# Patient Record
Sex: MALE | Race: BLACK OR AFRICAN AMERICAN | NOT HISPANIC OR LATINO | URBAN - METROPOLITAN AREA
[De-identification: names, ages, dates, MRNs, and addresses within clinical notes are randomized per-mention and may not be internally consistent; named-entity substitution may affect disease eponyms.]

---

## 2021-04-18 ENCOUNTER — EMERGENCY (EMERGENCY)
Facility: HOSPITAL | Age: 21
LOS: 1 days | Discharge: HOME | End: 2021-04-18
Admitting: EMERGENCY MEDICINE
Payer: COMMERCIAL

## 2021-04-18 VITALS
OXYGEN SATURATION: 98 % | HEART RATE: 83 BPM | SYSTOLIC BLOOD PRESSURE: 132 MMHG | RESPIRATION RATE: 16 BRPM | TEMPERATURE: 98 F | DIASTOLIC BLOOD PRESSURE: 74 MMHG

## 2021-04-18 PROCEDURE — 99283 EMERGENCY DEPT VISIT LOW MDM: CPT

## 2021-04-18 PROCEDURE — 73610 X-RAY EXAM OF ANKLE: CPT | Mod: 26,LT

## 2021-04-18 PROCEDURE — 73630 X-RAY EXAM OF FOOT: CPT | Mod: 26,LT

## 2021-04-18 RX ORDER — IBUPROFEN 200 MG
600 TABLET ORAL ONCE
Refills: 0 | Status: COMPLETED | OUTPATIENT
Start: 2021-04-18 | End: 2021-04-18

## 2021-04-18 RX ADMIN — Medication 600 MILLIGRAM(S): at 20:31

## 2021-04-18 NOTE — ED PROVIDER NOTE - PATIENT PORTAL LINK FT
You can access the FollowMyHealth Patient Portal offered by [facility] by registering at the following website: [email or web address]. By joining [employer]’s FollowMyHealth portal, you will also be able to view your health information using other applications (apps) compatible with our system.

## 2021-04-18 NOTE — ED PROVIDER NOTE - SKIN, MLM
From: Lesa Russo  To: RAKESH Sifuentes  Sent: 8/27/2019 4:44 PM EDT  Subject: Test Results Question    Was just wondering what my test results were for the emg?  ----- Message -----  From: Maci Diop  Sent: 6/10/2019 10:53 AM EDT  To: Lesa Russo  Subject: RE: Test Results Question  Hello    Your MRI cervical spine shows no recurrent or new stenosis. Recommendation is pain management. Please contact your insurance for a pain management provider. We will send referral and notes. ----- Message -----   From: Lesa Russo   Sent: 6/7/2019 2:43 PM EDT   To: RAKESH Sifuentes  Subject: Test Results Question    Did my MRI results come in yet? I haven't heard anything, sorry to be a pain, just wanted answers for issues. Thank you! Skin normal color for race, warm, dry and intact. No evidence of rash.

## 2021-04-18 NOTE — ED PROVIDER NOTE - MUSCULOSKELETAL, MLM
L foot with tenderness along 5th digit with edema to dorsal lateral aspect of foot. Ankle without tenderness. Normal DP/PT pulses

## 2021-04-18 NOTE — ED ADULT TRIAGE NOTE - CHIEF COMPLAINT QUOTE
Pt. states he was skateboarding when he fell of twisting his left foot. c/o pain to left foot worse when applying pressure. Denies tingling/numbness.

## 2021-04-18 NOTE — ED PROVIDER NOTE - OBJECTIVE STATEMENT
22 y/o M no reported pmhx p/w L foot pain and swelling after falling off skateboard. States he was jumping with skateboard off 3 steps and sustained eversion injury to L ankle upon landing. Pt reports pain to L lateral foot. Pain worse with palpation. Unable to bear weight. Pt denies any head injury or loc. No other injury or complaint.

## 2022-02-03 PROBLEM — Z78.9 OTHER SPECIFIED HEALTH STATUS: Chronic | Status: ACTIVE | Noted: 2021-04-18

## 2022-02-08 ENCOUNTER — APPOINTMENT (OUTPATIENT)
Dept: NEUROLOGY | Facility: CLINIC | Age: 22
End: 2022-02-08
Payer: COMMERCIAL

## 2022-02-08 VITALS
WEIGHT: 180 LBS | SYSTOLIC BLOOD PRESSURE: 131 MMHG | BODY MASS INDEX: 28.25 KG/M2 | HEIGHT: 67 IN | DIASTOLIC BLOOD PRESSURE: 83 MMHG | HEART RATE: 71 BPM

## 2022-02-08 DIAGNOSIS — Z78.9 OTHER SPECIFIED HEALTH STATUS: ICD-10-CM

## 2022-02-08 DIAGNOSIS — G47.00 INSOMNIA, UNSPECIFIED: ICD-10-CM

## 2022-02-08 DIAGNOSIS — S06.0X1A CONCUSSION WITH LOSS OF CONSCIOUSNESS OF 30 MINUTES OR LESS, INITIAL ENCOUNTER: ICD-10-CM

## 2022-02-08 DIAGNOSIS — R56.1 POST TRAUMATIC SEIZURES: ICD-10-CM

## 2022-02-08 DIAGNOSIS — J30.2 OTHER SEASONAL ALLERGIC RHINITIS: ICD-10-CM

## 2022-02-08 PROCEDURE — 99205 OFFICE O/P NEW HI 60 MIN: CPT

## 2022-02-08 RX ORDER — MULTIVIT-MIN/FOLIC/VIT K/LYCOP 400-300MCG
50 MCG TABLET ORAL
Qty: 30 | Refills: 0 | Status: ACTIVE | COMMUNITY
Start: 2022-02-08

## 2022-02-08 RX ORDER — ASCORBIC ACID 500 MG
500 TABLET ORAL DAILY
Qty: 30 | Refills: 0 | Status: ACTIVE | COMMUNITY
Start: 2022-02-08

## 2022-02-08 RX ORDER — MAGNESIUM OXIDE 241.3 MG/1000MG
400 TABLET ORAL DAILY
Qty: 90 | Refills: 0 | Status: ACTIVE | COMMUNITY
Start: 2022-02-08

## 2022-02-08 RX ORDER — CETIRIZINE HYDROCHLORIDE 5 MG/1
5 TABLET ORAL
Qty: 30 | Refills: 0 | Status: ACTIVE | COMMUNITY
Start: 2022-02-08

## 2022-02-08 RX ORDER — VITAMIN B COMPLEX
CAPSULE ORAL
Qty: 90 | Refills: 1 | Status: ACTIVE | COMMUNITY
Start: 2022-02-08

## 2022-02-08 RX ORDER — ELECTROLYTES/DEXTROSE
SOLUTION, ORAL ORAL DAILY
Qty: 30 | Refills: 0 | Status: ACTIVE | COMMUNITY
Start: 2022-02-08

## 2022-02-08 NOTE — ASSESSMENT
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 idiopathic generalized epilepsy.

## 2022-02-08 NOTE — HISTORY OF PRESENT ILLNESS
[FreeTextEntry1] : This is a 21-year-old right-handed man who states that one week ago, he was in his bathroom getting ready for school (currently a senior at [employer]), and then woke up to find himself surrounded by EMTs. He does not recall what occurred, but he was told by his mother and younger brother stated that they could hear him shaking. The broke the bathroom door down, and found him leaning against the glass shower door with his whole body shaking. They observed this going on for at least 1 minute. He was observed to be confused and not responding to commands for 4 minutes, and then he was back to normal after that. He cannot identify any provoking factors, although he acknowledges sleeping for only about 5 hours the night before this seizure occurred. He was brought to the [facility] ED [place], where he had a CT Head, blood tests, and urine tests, all of which were reported to be normal. He recounts that 1 year and 3 weeks ago, he fell while skateboarding and hit his head, with brief loss of consciousness, and he was observed to have whole body shaking for less than 1 minute, with only a few seconds of confusion before returning to normal.

## 2022-02-08 NOTE — PHYSICAL EXAM
[General Appearance - Alert] : alert [General Appearance - In No Acute Distress] : in no acute distress [General Appearance - Well Nourished] : well nourished [General Appearance - Well Developed] : well developed [General Appearance - Well-Appearing] : healthy appearing [Oriented To Time, Place, And Person] : oriented to person, place, and time [Impaired Insight] : insight and judgment were intact [Affect] : the affect was normal [Mood] : the mood was normal [Person] : oriented to person [Place] : oriented to place [Time] : oriented to time [Concentration Intact] : normal concentrating ability [Visual Intact] : visual attention was ~T not ~L decreased [Fluency] : fluency intact [Comprehension] : comprehension intact [Cranial Nerves Optic (II)] : visual acuity intact bilaterally,  visual fields full to confrontation, pupils equal round and reactive to light [Cranial Nerves Oculomotor (III)] : extraocular motion intact [Cranial Nerves Trigeminal (V)] : facial sensation intact symmetrically [Cranial Nerves Facial (VII)] : face symmetrical [Cranial Nerves Vestibulocochlear (VIII)] : hearing was intact bilaterally [Cranial Nerves Glossopharyngeal (IX)] : tongue and palate midline [Cranial Nerves Accessory (XI - Cranial And Spinal)] : head turning and shoulder shrug symmetric [Cranial Nerves Hypoglossal (XII)] : there was no tongue deviation with protrusion [Motor Strength] : muscle strength was normal in all four extremities [No Muscle Atrophy] : normal bulk in all four extremities [Motor Handedness Right-Handed] : the patient is right hand dominant [Sensation Tactile Decrease] : light touch was intact [Sensation Pain / Temperature Decrease] : pain and temperature was intact [Balance] : balance was intact [2+] : Ankle jerk left 2+ [Sclera] : the sclera and conjunctiva were normal [PERRL With Normal Accommodation] : pupils were equal in size, round, reactive to light, with normal accommodation [Extraocular Movements] : extraocular movements were intact [Outer Ear] : the ears and nose were normal in appearance [Hearing Threshold Finger Rub Not [place]] : hearing was normal [Oropharynx] : the oropharynx was normal [Neck Appearance] : the appearance of the neck was normal [Auscultation Breath Sounds / Voice Sounds] : lungs were clear to auscultation bilaterally [Heart Rate And Rhythm] : heart rate was normal and rhythm regular [Heart Sounds] : normal S1 and S2 [Arterial Pulses Carotid] : carotid pulses were normal with no bruits [Full Pulse] : the pedal pulses are present [Abdomen Soft] : soft [Abdomen Tenderness] : non-tender [Abdomen Mass (___ Cm)] : no abdominal mass palpated [No CVA Tenderness] : no ~M costovertebral angle tenderness [No Spinal Tenderness] : no spinal tenderness [Abnormal Walk] : normal gait [Nail Clubbing] : no clubbing  or cyanosis of the fingernails [Musculoskeletal - Swelling] : no joint swelling seen [Motor Tone] : muscle strength and tone were normal [Skin Color & Pigmentation] : normal skin color and pigmentation [Skin Turgor] : normal skin turgor [] : no rash [Paresis Pronator Drift Right-Sided] : no pronator drift on the right [Paresis Pronator Drift Left-Sided] : no pronator drift on the left [Motor Strength Upper Extremities Bilaterally] : strength was normal in both upper extremities [Motor Strength Lower Extremities Bilaterally] : strength was normal in both lower extremities [Romberg's Sign] : Romberg's sign was negtive [Allodynia] : no ~T allodynia present [Past-pointing] : there was no past-pointing [Tremor] : no tremor present [Dysdiadochokinesia Bilaterally] : not present [Coordination - Dysmetria Impaired Finger-to-Nose Bilateral] : not present [Coordination - Dysmetria Impaired Heel-to-Shin Bilateral] : not present [Plantar Reflex Right Only] : normal on the right [Plantar Reflex Left Only] : normal on the left [___] : absent on the right [___] : absent on the left [FreeTextEntry8] : Normal, narrow-based gait. No difficulty with tiptoe, heel, and tandem gaits.

## 2022-02-23 ENCOUNTER — APPOINTMENT (OUTPATIENT)
Dept: NEUROLOGY | Facility: CLINIC | Age: 22
End: 2022-02-23
Payer: COMMERCIAL

## 2022-02-23 PROCEDURE — 95816 EEG AWAKE AND DROWSY: CPT

## 2022-02-24 ENCOUNTER — APPOINTMENT (OUTPATIENT)
Dept: NEUROLOGY | Facility: CLINIC | Age: 22
End: 2022-02-24
Payer: COMMERCIAL

## 2022-02-24 PROCEDURE — 99443: CPT

## 2022-03-01 ENCOUNTER — APPOINTMENT (OUTPATIENT)
Dept: INTERNAL MEDICINE | Facility: CLINIC | Age: 22
End: 2022-03-01
Payer: COMMERCIAL

## 2022-03-01 VITALS
TEMPERATURE: 98.3 F | HEART RATE: 104 BPM | SYSTOLIC BLOOD PRESSURE: 123 MMHG | BODY MASS INDEX: 29.03 KG/M2 | OXYGEN SATURATION: 98 % | HEIGHT: 67 IN | DIASTOLIC BLOOD PRESSURE: 75 MMHG | WEIGHT: 185 LBS

## 2022-03-01 DIAGNOSIS — L05.01 PILONIDAL CYST WITH ABSCESS: ICD-10-CM

## 2022-03-01 PROCEDURE — 99204 OFFICE O/P NEW MOD 45 MIN: CPT

## 2022-03-01 NOTE — PHYSICAL EXAM
[No Acute Distress] : no acute distress [Well Nourished] : well nourished [Well Developed] : well developed [Well-Appearing] : well-appearing [Normal Sclera/Conjunctiva] : normal sclera/conjunctiva [PERRL] : pupils equal round and reactive to light [EOMI] : extraocular movements intact [Normal Outer Ear/Nose] : the outer ears and nose were normal in appearance [Normal Oropharynx] : the oropharynx was normal [No JVD] : no jugular venous distention [No Lymphadenopathy] : no lymphadenopathy [Supple] : supple [Thyroid Normal, No Nodules] : the thyroid was normal and there were no nodules present [No Respiratory Distress] : no respiratory distress  [No Accessory Muscle Use] : no accessory muscle use [Clear to Auscultation] : lungs were clear to auscultation bilaterally [Normal Rate] : normal rate  [Regular Rhythm] : with a regular rhythm [Normal S1, S2] : normal S1 and S2 [No Murmur] : no murmur heard [No Carotid Bruits] : no carotid bruits [No Abdominal Bruit] : a ~M bruit was not heard ~T in the abdomen [No Varicosities] : no varicosities [Pedal Pulses Present] : the pedal pulses are present [No Edema] : there was no peripheral edema [No Palpable Aorta] : no palpable aorta [No Extremity Clubbing/Cyanosis] : no extremity clubbing/cyanosis [Soft] : abdomen soft [Non Tender] : non-tender [Non-distended] : non-distended [No Masses] : no abdominal mass palpated [No HSM] : no HSM [Normal Bowel Sounds] : normal bowel sounds [Normal Posterior Cervical Nodes] : no posterior cervical lymphadenopathy [Normal Anterior Cervical Nodes] : no anterior cervical lymphadenopathy [No CVA Tenderness] : no CVA  tenderness [No Spinal Tenderness] : no spinal tenderness [No Joint Swelling] : no joint swelling [Grossly Normal Strength/Tone] : grossly normal strength/tone [No Rash] : no rash [Coordination Grossly Intact] : coordination grossly intact [No Focal Deficits] : no focal deficits [Normal Gait] : normal gait [Deep Tendon Reflexes (DTR)] : deep tendon reflexes were 2+ and symmetric [Normal Affect] : the affect was normal [Normal Insight/Judgement] : insight and judgment were intact [FreeTextEntry1] : large piloidal abscess

## 2022-03-01 NOTE — HEALTH RISK ASSESSMENT
[Never] : Never [Yes] : Yes [2 - 3 times a week (3 pts)] : 2 - 3  times a week (3 points) [1 or 2 (0 pts)] : 1 or 2 (0 points) [Never (0 pts)] : Never (0 points) [No] : In the past 12 months have you used drugs other than those required for medical reasons? No [No falls in past year] : Patient reported no falls in the past year [0] : 2) Feeling down, depressed, or hopeless: Not at all (0) [Audit-CScore] : 3 [de-identified] : Skate boarding [de-identified] : Normal [[id number]] : 0

## 2022-03-01 NOTE — ASSESSMENT
[FreeTextEntry1] : piloidal abscess- abx ordered\par gen surg referral for I[facility]\par \par will follow\par \par

## 2022-03-03 ENCOUNTER — APPOINTMENT (OUTPATIENT)
Dept: SURGERY | Facility: CLINIC | Age: 22
End: 2022-03-03
Payer: COMMERCIAL

## 2022-03-03 VITALS
SYSTOLIC BLOOD PRESSURE: 125 MMHG | TEMPERATURE: 98 F | DIASTOLIC BLOOD PRESSURE: 82 MMHG | OXYGEN SATURATION: 99 % | HEIGHT: 67 IN | WEIGHT: 185 LBS | BODY MASS INDEX: 29.03 KG/M2 | HEART RATE: 96 BPM

## 2022-03-03 PROCEDURE — 10081 I&D PILONIDAL CYST COMP: CPT

## 2022-03-03 NOTE — PROCEDURE
[FreeTextEntry1] : After discussing the indications, risks, benefits, and alternatives, the skin around area of pilonidal abscess was cleaned with betadine and infiltrated with total of 25cc 1% lidocaine. The opening of the abscess was identified and purulent material expressed out with pressure; culture collected. Then an incision was made to extend the opening and facilitate drainage. A deeper culture was taken. The abscess was noted to be quite large, about 5-6cm deep, 5cm cephalo-caudally, and 3cm transversely. Irrigated with 30cc of saline and packed with 1/4" iodoform packing. Dressed with gauze and tape. Patient tolerated the procedure well.

## 2022-03-03 NOTE — ASSESSMENT
[FreeTextEntry1] : 22yo male with PMH of seizure after a fall skateboarding, presenting to office with gluteal cleft pain and redness. He reports history of clear fluid draining intermittently from area over the years. One week ago he noticed pain in the area, and 2 days ago it started draining pus. He saw his PMD who started him on augmentin and referred to me. He denies fevers/chills. Denies issues with continence, anal drainage of pus.\par \par Today s/p incision and drainage of pilonidal abscess (see above).\par \par Follow up with me in office tomorrow for removal of packing and placement of more.

## 2022-03-04 ENCOUNTER — APPOINTMENT (OUTPATIENT)
Dept: SURGERY | Facility: CLINIC | Age: 22
End: 2022-03-04
Payer: COMMERCIAL

## 2022-03-04 VITALS
BODY MASS INDEX: 29.03 KG/M2 | HEIGHT: 67 IN | WEIGHT: 185 LBS | SYSTOLIC BLOOD PRESSURE: 146 MMHG | OXYGEN SATURATION: 97 % | TEMPERATURE: 98.2 F | DIASTOLIC BLOOD PRESSURE: 74 MMHG | HEART RATE: 99 BPM

## 2022-03-04 PROCEDURE — 99024 POSTOP FOLLOW-UP VISIT: CPT

## 2022-03-10 ENCOUNTER — APPOINTMENT (OUTPATIENT)
Dept: SURGERY | Facility: CLINIC | Age: 22
End: 2022-03-10
Payer: COMMERCIAL

## 2022-03-10 VITALS
HEIGHT: 67 IN | TEMPERATURE: 98.2 F | HEART RATE: 98 BPM | OXYGEN SATURATION: 90 % | WEIGHT: 185 LBS | BODY MASS INDEX: 29.03 KG/M2

## 2022-03-10 LAB
BACTERIA WND CULT: ABNORMAL
BACTERIA WND CULT: ABNORMAL

## 2022-03-10 PROCEDURE — 99024 POSTOP FOLLOW-UP VISIT: CPT

## 2022-03-10 NOTE — ASSESSMENT
[FreeTextEntry1] : 22 yo male with pilonidal dz, s/p i&D of abscess on 3/3/22. I counseled him on keeping the gluteal cleft hairless and clean/dry. I also asked him to follow up with any return of symptoms of infection. I went over his cultures which were not concerning for resistant or rare bacteria. Follow up with me PRN.

## 2022-03-10 NOTE — PHYSICAL EXAM
[Calm] : calm [de-identified] : No acute distress, well appearing [de-identified] : Clean appearing I&D site, probed, now only 2-3cm in depth, clean based, clear/serous output on compression, no surrounding erythema, non tender.

## 2022-03-10 NOTE — HISTORY OF PRESENT ILLNESS
[de-identified] : 22 yo male previously healthy with pilonidal abscess s/p drainage on 3/3/22, presenting for evaluation of healing I&D site. Reports resolution of pain, no discharge, no fevers/chills.

## 2022-03-20 ENCOUNTER — APPOINTMENT (OUTPATIENT)
Dept: MRI IMAGING | Facility: IMAGING CENTER | Age: 22
End: 2022-03-20
Payer: COMMERCIAL

## 2022-03-20 ENCOUNTER — OUTPATIENT (OUTPATIENT)
Dept: OUTPATIENT SERVICES | Facility: HOSPITAL | Age: 22
LOS: 1 days | End: 2022-03-20
Payer: COMMERCIAL

## 2022-03-20 DIAGNOSIS — G40.909 EPILEPSY, UNSPECIFIED, NOT INTRACTABLE, WITHOUT STATUS EPILEPTICUS: ICD-10-CM

## 2022-03-20 PROCEDURE — A9585: CPT

## 2022-03-20 PROCEDURE — 76377 3D RENDER W/INTRP POSTPROCES: CPT

## 2022-03-20 PROCEDURE — 70553 MRI BRAIN STEM W/O & W/DYE: CPT | Mod: 26

## 2022-03-20 PROCEDURE — 70553 MRI BRAIN STEM W/O & W/DYE: CPT

## 2022-03-20 PROCEDURE — 76377 3D RENDER W/INTRP POSTPROCES: CPT | Mod: 26

## 2022-04-08 ENCOUNTER — APPOINTMENT (OUTPATIENT)
Dept: NEUROLOGY | Facility: CLINIC | Age: 22
End: 2022-04-08

## 2022-04-22 ENCOUNTER — APPOINTMENT (OUTPATIENT)
Dept: NEUROLOGY | Facility: CLINIC | Age: 22
End: 2022-04-22
Payer: COMMERCIAL

## 2022-04-22 PROCEDURE — 99443: CPT

## 2022-04-25 ENCOUNTER — APPOINTMENT (OUTPATIENT)
Dept: INTERNAL MEDICINE | Facility: CLINIC | Age: 22
End: 2022-04-25
Payer: COMMERCIAL

## 2022-04-25 VITALS
HEART RATE: 78 BPM | TEMPERATURE: 97.6 F | BODY MASS INDEX: 29.03 KG/M2 | DIASTOLIC BLOOD PRESSURE: 72 MMHG | HEIGHT: 67 IN | OXYGEN SATURATION: 99 % | SYSTOLIC BLOOD PRESSURE: 103 MMHG | WEIGHT: 185 LBS

## 2022-04-25 PROCEDURE — G0444 DEPRESSION SCREEN ANNUAL: CPT

## 2022-04-25 PROCEDURE — 99395 PREV VISIT EST AGE 18-39: CPT | Mod: 25

## 2022-04-25 RX ORDER — AMOXICILLIN AND CLAVULANATE POTASSIUM 875; 125 MG/1; MG/1
875-125 TABLET, COATED ORAL TWICE DAILY
Qty: 20 | Refills: 0 | Status: DISCONTINUED | COMMUNITY
Start: 2022-03-01 | End: 2022-04-25

## 2022-04-25 NOTE — HISTORY OF PRESENT ILLNESS
[FreeTextEntry1] : annual exam [de-identified] : annual exam\par \par PMHx seizure d/o- seeing neurology on meds \par last seizure 2/1/22\par \par no acute complaints at this time\par

## 2022-04-25 NOTE — HEALTH RISK ASSESSMENT
[Good] : ~his/her~  mood as  good [Never] : Never [No] : In the past 12 months have you used drugs other than those required for medical reasons? No [No falls in past year] : Patient reported no falls in the past year [0] : 2) Feeling down, depressed, or hopeless: Not at all (0) [HIV test declined] : HIV test declined [Hepatitis C test declined] : Hepatitis C test declined [With Family] : lives with family [# of Members in Household ___] :  household currently consist of [unfilled] member(s) [Employed] : employed [Student] : student [Single] : single [Feels Safe at Home] : Feels safe at home [Smoke Detector] : smoke detector [Carbon Monoxide Detector] : carbon monoxide detector [Safety elements used in home] : safety elements used in home [Seat Belt] :  uses seat belt [Sunscreen] : uses sunscreen [PHQ-2 Negative - No further assessment needed] : PHQ-2 Negative - No further assessment needed [Audit-CScore] : 0 [de-identified] : 0 [de-identified] : Skate Boarding [de-identified] : Healthy [[id number]] : 0 [Reports changes in hearing] : Reports no changes in hearing [Reports changes in vision] : Reports no changes in vision [Reports changes in dental health] : Reports no changes in dental health

## 2022-04-25 NOTE — ASSESSMENT
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

## 2022-04-27 LAB
25(OH)D3 SERPL-MCNC: 42.5 NG/ML
ALBUMIN SERPL ELPH-MCNC: 5.2 G/DL
ALP BLD-CCNC: 66 U/L
ALT SERPL-CCNC: 28 U/L
ANION GAP SERPL CALC-SCNC: 15 MMOL/L
AST SERPL-CCNC: 19 U/L
BASOPHILS # BLD AUTO: 0.04 K/UL
BASOPHILS NFR BLD AUTO: 0.7 %
BILIRUB SERPL-MCNC: 0.6 MG/DL
BUN SERPL-MCNC: 12 MG/DL
CALCIUM SERPL-MCNC: 10.1 MG/DL
CHLORIDE SERPL-SCNC: 103 MMOL/L
CHOLEST SERPL-MCNC: 216 MG/DL
CO2 SERPL-SCNC: 21 MMOL/L
CREAT SERPL-MCNC: 0.77 MG/DL
EGFR: 130 ML/MIN/1.73M2
EOSINOPHIL # BLD AUTO: 0.06 K/UL
EOSINOPHIL NFR BLD AUTO: 1 %
GLUCOSE SERPL-MCNC: 88 MG/DL
HCT VFR BLD CALC: 46.4 %
HDLC SERPL-MCNC: 49 MG/DL
HGB BLD-MCNC: 15.2 G/DL
IMM GRANULOCYTES NFR BLD AUTO: 0.2 %
LDLC SERPL CALC-MCNC: 153 MG/DL
LYMPHOCYTES # BLD AUTO: 1.85 K/UL
LYMPHOCYTES NFR BLD AUTO: 31.3 %
MAN DIFF?: NORMAL
MCHC RBC-ENTMCNC: 30.2 PG
MCHC RBC-ENTMCNC: 32.8 GM/DL
MCV RBC AUTO: 92.1 FL
MONOCYTES # BLD AUTO: 0.42 K/UL
MONOCYTES NFR BLD AUTO: 7.1 %
NEUTROPHILS # BLD AUTO: 3.53 K/UL
NEUTROPHILS NFR BLD AUTO: 59.7 %
NONHDLC SERPL-MCNC: 167 MG/DL
PLATELET # BLD AUTO: 370 K/UL
POTASSIUM SERPL-SCNC: 4.4 MMOL/L
PROT SERPL-MCNC: 7.9 G/DL
RBC # BLD: 5.04 M/UL
RBC # FLD: 11.6 %
SODIUM SERPL-SCNC: 138 MMOL/L
TRIGL SERPL-MCNC: 69 MG/DL
TSH SERPL-ACNC: 0.89 UIU/ML
WBC # FLD AUTO: 5.91 K/UL

## 2022-05-13 ENCOUNTER — RX RENEWAL (OUTPATIENT)
Age: 22
End: 2022-05-13

## 2022-06-09 ENCOUNTER — APPOINTMENT (OUTPATIENT)
Dept: NEUROLOGY | Facility: CLINIC | Age: 22
End: 2022-06-09

## 2022-08-29 ENCOUNTER — RX RENEWAL (OUTPATIENT)
Age: 22
End: 2022-08-29

## 2022-09-27 ENCOUNTER — APPOINTMENT (OUTPATIENT)
Dept: NEUROLOGY | Facility: CLINIC | Age: 22
End: 2022-09-27

## 2022-09-27 VITALS
SYSTOLIC BLOOD PRESSURE: 111 MMHG | HEIGHT: 67 IN | HEART RATE: 75 BPM | WEIGHT: 180 LBS | BODY MASS INDEX: 28.25 KG/M2 | DIASTOLIC BLOOD PRESSURE: 71 MMHG

## 2022-09-27 PROCEDURE — 99215 OFFICE O/P EST HI 40 MIN: CPT

## 2022-09-27 RX ORDER — GLUCOSAMINE HCL/CHONDROITIN SU 500-400 MG
3 CAPSULE ORAL AT BEDTIME
Qty: 30 | Refills: 0 | Status: DISCONTINUED | COMMUNITY
Start: 2022-02-08 | End: 2022-09-27

## 2022-09-27 NOTE — PHYSICAL EXAM
[General Appearance - Alert] : alert [General Appearance - In No Acute Distress] : in no acute distress [General Appearance - Well Nourished] : well nourished [General Appearance - Well Developed] : well developed [General Appearance - Well-Appearing] : healthy appearing [Oriented To Time, Place, And Person] : oriented to person, place, and time [Impaired Insight] : insight and judgment were intact [Affect] : the affect was normal [Mood] : the mood was normal [Person] : oriented to person [Place] : oriented to place [Time] : oriented to time [Concentration Intact] : normal concentrating ability [Visual Intact] : visual attention was ~T not ~L decreased [Fluency] : fluency intact [Comprehension] : comprehension intact [Cranial Nerves Optic (II)] : visual acuity intact bilaterally,  visual fields full to confrontation, pupils equal round and reactive to light [Cranial Nerves Oculomotor (III)] : extraocular motion intact [Cranial Nerves Trigeminal (V)] : facial sensation intact symmetrically [Cranial Nerves Facial (VII)] : face symmetrical [Cranial Nerves Vestibulocochlear (VIII)] : hearing was intact bilaterally [Cranial Nerves Glossopharyngeal (IX)] : tongue and palate midline [Cranial Nerves Accessory (XI - Cranial And Spinal)] : head turning and shoulder shrug symmetric [Cranial Nerves Hypoglossal (XII)] : there was no tongue deviation with protrusion [Motor Strength] : muscle strength was normal in all four extremities [No Muscle Atrophy] : normal bulk in all four extremities [Motor Handedness Right-Handed] : the patient is right hand dominant [Sensation Tactile Decrease] : light touch was intact [Sensation Pain / Temperature Decrease] : pain and temperature was intact [Balance] : balance was intact [2+] : Ankle jerk left 2+ [Sclera] : the sclera and conjunctiva were normal [PERRL With Normal Accommodation] : pupils were equal in size, round, reactive to light, with normal accommodation [Extraocular Movements] : extraocular movements were intact [Outer Ear] : the ears and nose were normal in appearance [Hearing Threshold Finger Rub Not [place]] : hearing was normal [Oropharynx] : the oropharynx was normal [Neck Appearance] : the appearance of the neck was normal [Auscultation Breath Sounds / Voice Sounds] : lungs were clear to auscultation bilaterally [Heart Rate And Rhythm] : heart rate was normal and rhythm regular [Heart Sounds] : normal S1 and S2 [Arterial Pulses Carotid] : carotid pulses were normal with no bruits [Full Pulse] : the pedal pulses are present [Abdomen Soft] : soft [Abdomen Tenderness] : non-tender [Abdomen Mass (___ Cm)] : no abdominal mass palpated [No CVA Tenderness] : no ~M costovertebral angle tenderness [No Spinal Tenderness] : no spinal tenderness [Abnormal Walk] : normal gait [Nail Clubbing] : no clubbing  or cyanosis of the fingernails [Musculoskeletal - Swelling] : no joint swelling seen [Motor Tone] : muscle strength and tone were normal [Skin Color & Pigmentation] : normal skin color and pigmentation [Skin Turgor] : normal skin turgor [] : no rash [Paresis Pronator Drift Right-Sided] : no pronator drift on the right [Paresis Pronator Drift Left-Sided] : no pronator drift on the left [Motor Strength Upper Extremities Bilaterally] : strength was normal in both upper extremities [Motor Strength Lower Extremities Bilaterally] : strength was normal in both lower extremities [Romberg's Sign] : Romberg's sign was negtive [Allodynia] : no ~T allodynia present [Past-pointing] : there was no past-pointing [Tremor] : no tremor present [Dysdiadochokinesia Bilaterally] : not present [Coordination - Dysmetria Impaired Finger-to-Nose Bilateral] : not present [Coordination - Dysmetria Impaired Heel-to-Shin Bilateral] : not present [Plantar Reflex Right Only] : normal on the right [Plantar Reflex Left Only] : normal on the left [___] : absent on the right [___] : absent on the left [FreeTextEntry8] : Normal, narrow-based gait. No difficulty with tiptoe, heel, and tandem gaits.

## 2022-09-27 NOTE — DATA REVIEWED
[de-identified] : MRI Brain w/wo Gadolinium (Epilepsy protocol) (3/20/22):\par - No acute intracranial abnormality or abnormal enhancement [de-identified] : Routine EEG (2/23/22): Normal awake, drowsy, and asleep study

## 2022-09-27 NOTE — ASSESSMENT
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 idiopathic generalized epilepsy, with normal MRI Brain and EEG findings.

## 2022-09-27 NOTE — HISTORY OF PRESENT ILLNESS
[FreeTextEntry1] : FROM 2/8/22:\par This is a 21-year-old right-handed man who states that one week ago, he was in his bathroom getting ready for school (currently a senior at [employer]), and then woke up to find himself surrounded by EMTs. He does not recall what occurred, but he was told by his mother and younger brother stated that they could hear him shaking. The broke the bathroom door down, and found him leaning against the glass shower door with his whole body shaking. They observed this going on for at least 1 minute. He was observed to be confused and not responding to commands for 4 minutes, and then he was back to normal after that. He cannot identify any provoking factors, although he acknowledges sleeping for only about 5 hours the night before this seizure occurred. He was brought to the [facility] ED [place], where he had a CT Head, blood tests, and urine tests, all of which were reported to be normal. He recounts that 1 year and 3 weeks ago, he fell while skateboarding and hit his head, with brief loss of consciousness, and he was observed to have whole body shaking for less than 1 minute, with only a few seconds of confusion before returning to normal.\par \par FROM 9/27/22:\par The patient, now 22, states that he has not had any seizures nor any side effects from Zonisamide 100mg PO QHS since I last saw him on 2/8/22. He is typically sleeping 6-7 hours per night, and no longer needs to use Melatonin to help him sleep.

## 2023-01-13 ENCOUNTER — NON-APPOINTMENT (OUTPATIENT)
Age: 23
End: 2023-01-13

## 2023-01-27 ENCOUNTER — NON-APPOINTMENT (OUTPATIENT)
Age: 23
End: 2023-01-27

## 2023-02-08 ENCOUNTER — APPOINTMENT (OUTPATIENT)
Dept: NEUROLOGY | Facility: CLINIC | Age: 23
End: 2023-02-08
Payer: COMMERCIAL

## 2023-02-08 VITALS
SYSTOLIC BLOOD PRESSURE: 128 MMHG | OXYGEN SATURATION: 97 % | HEIGHT: 67 IN | WEIGHT: 188 LBS | TEMPERATURE: 98.9 F | DIASTOLIC BLOOD PRESSURE: 81 MMHG | HEART RATE: 84 BPM | BODY MASS INDEX: 29.51 KG/M2

## 2023-02-08 PROCEDURE — 99215 OFFICE O/P EST HI 40 MIN: CPT

## 2023-03-23 NOTE — DATA REVIEWED
[de-identified] : MRI Brain w/wo Gadolinium (Epilepsy protocol) (3/20/22):\par - No acute intracranial abnormality or abnormal enhancement [de-identified] : Routine EEG (2/23/22): Normal awake, drowsy, and asleep study

## 2023-03-23 NOTE — PHYSICAL EXAM
[General Appearance - Alert] : alert [General Appearance - In No Acute Distress] : in no acute distress [General Appearance - Well Nourished] : well nourished [General Appearance - Well Developed] : well developed [General Appearance - Well-Appearing] : healthy appearing [Oriented To Time, Place, And Person] : oriented to person, place, and time [Impaired Insight] : insight and judgment were intact [Affect] : the affect was normal [Mood] : the mood was normal [Person] : oriented to person [Place] : oriented to place [Time] : oriented to time [Concentration Intact] : normal concentrating ability [Visual Intact] : visual attention was ~T not ~L decreased [Fluency] : fluency intact [Comprehension] : comprehension intact [Cranial Nerves Optic (II)] : visual acuity intact bilaterally,  visual fields full to confrontation, pupils equal round and reactive to light [Cranial Nerves Oculomotor (III)] : extraocular motion intact [Cranial Nerves Trigeminal (V)] : facial sensation intact symmetrically [Cranial Nerves Facial (VII)] : face symmetrical [Cranial Nerves Vestibulocochlear (VIII)] : hearing was intact bilaterally [Cranial Nerves Glossopharyngeal (IX)] : tongue and palate midline [Cranial Nerves Accessory (XI - Cranial And Spinal)] : head turning and shoulder shrug symmetric [Cranial Nerves Hypoglossal (XII)] : there was no tongue deviation with protrusion [Motor Strength] : muscle strength was normal in all four extremities [No Muscle Atrophy] : normal bulk in all four extremities [Motor Handedness Right-Handed] : the patient is right hand dominant [Sensation Tactile Decrease] : light touch was intact [Sensation Pain / Temperature Decrease] : pain and temperature was intact [Balance] : balance was intact [2+] : Ankle jerk left 2+ [Sclera] : the sclera and conjunctiva were normal [PERRL With Normal Accommodation] : pupils were equal in size, round, reactive to light, with normal accommodation [Extraocular Movements] : extraocular movements were intact [Outer Ear] : the ears and nose were normal in appearance [Hearing Threshold Finger Rub Not [place]] : hearing was normal [Oropharynx] : the oropharynx was normal [Neck Appearance] : the appearance of the neck was normal [Auscultation Breath Sounds / Voice Sounds] : lungs were clear to auscultation bilaterally [Heart Rate And Rhythm] : heart rate was normal and rhythm regular [Heart Sounds] : normal S1 and S2 [Arterial Pulses Carotid] : carotid pulses were normal with no bruits [Full Pulse] : the pedal pulses are present [Abdomen Soft] : soft [Abdomen Tenderness] : non-tender [Abdomen Mass (___ Cm)] : no abdominal mass palpated [No CVA Tenderness] : no ~M costovertebral angle tenderness [No Spinal Tenderness] : no spinal tenderness [Abnormal Walk] : normal gait [Nail Clubbing] : no clubbing  or cyanosis of the fingernails [Musculoskeletal - Swelling] : no joint swelling seen [Motor Tone] : muscle strength and tone were normal [Skin Color & Pigmentation] : normal skin color and pigmentation [Skin Turgor] : normal skin turgor [] : no rash [Paresis Pronator Drift Right-Sided] : no pronator drift on the right [Paresis Pronator Drift Left-Sided] : no pronator drift on the left [Motor Strength Upper Extremities Bilaterally] : strength was normal in both upper extremities [Motor Strength Lower Extremities Bilaterally] : strength was normal in both lower extremities [Romberg's Sign] : Romberg's sign was negtive [Allodynia] : no ~T allodynia present [Past-pointing] : there was no past-pointing [Tremor] : no tremor present [Dysdiadochokinesia Bilaterally] : not present [Coordination - Dysmetria Impaired Finger-to-Nose Bilateral] : not present [Coordination - Dysmetria Impaired Heel-to-Shin Bilateral] : not present [Plantar Reflex Right Only] : normal on the right [Plantar Reflex Left Only] : normal on the left [___] : absent on the right [___] : absent on the left [FreeTextEntry8] : Normal, narrow-based gait. No difficulty with tiptoe, heel, and tandem gaits.

## 2023-03-23 NOTE — HISTORY OF PRESENT ILLNESS
[FreeTextEntry1] : FROM 2/8/22:\par This is a 21-year-old right-handed man who states that one week ago, he was in his bathroom getting ready for school (currently a senior at [employer]), and then woke up to find himself surrounded by EMTs. He does not recall what occurred, but he was told by his mother and younger brother stated that they could hear him shaking. The broke the bathroom door down, and found him leaning against the glass shower door with his whole body shaking. They observed this going on for at least 1 minute. He was observed to be confused and not responding to commands for 4 minutes, and then he was back to normal after that. He cannot identify any provoking factors, although he acknowledges sleeping for only about 5 hours the night before this seizure occurred. He was brought to the [facility] ED [place], where he had a CT Head, blood tests, and urine tests, all of which were reported to be normal. He recounts that 1 year and 3 weeks ago, he fell while skateboarding and hit his head, with brief loss of consciousness, and he was observed to have whole body shaking for less than 1 minute, with only a few seconds of confusion before returning to normal.\par \par FROM 9/27/22:\par The patient, now 22, states that he has not had any seizures nor any side effects from Zonisamide 100mg PO QHS since I last saw him on 2/8/22. He is typically sleeping 6-7 hours per night, and no longer needs to use Melatonin to help him sleep.\par \par FROM 2/8/23:\par Since I last saw the patient on 9/27/22, he has not had any breakthrough seizures or adverse effects while taking Zonisamide 100mg PO QHS. He continues to sleep 6-7 hours per night, without the need for a sleep aid. He has not had any recent major illnesses or hospitalizations.

## 2023-09-07 ENCOUNTER — APPOINTMENT (OUTPATIENT)
Dept: INTERNAL MEDICINE | Facility: CLINIC | Age: 23
End: 2023-09-07
Payer: COMMERCIAL

## 2023-09-07 ENCOUNTER — RX RENEWAL (OUTPATIENT)
Age: 23
End: 2023-09-07

## 2023-09-07 ENCOUNTER — NON-APPOINTMENT (OUTPATIENT)
Age: 23
End: 2023-09-07

## 2023-09-07 VITALS
DIASTOLIC BLOOD PRESSURE: 85 MMHG | OXYGEN SATURATION: 96 % | HEART RATE: 84 BPM | TEMPERATURE: 98.4 F | HEIGHT: 67 IN | SYSTOLIC BLOOD PRESSURE: 123 MMHG | WEIGHT: 180 LBS | BODY MASS INDEX: 28.25 KG/M2

## 2023-09-07 DIAGNOSIS — Z00.00 ENCOUNTER FOR GENERAL ADULT MEDICAL EXAMINATION W/OUT ABNORMAL FINDINGS: ICD-10-CM

## 2023-09-07 DIAGNOSIS — Z13.31 ENCOUNTER FOR SCREENING FOR DEPRESSION: ICD-10-CM

## 2023-09-07 DIAGNOSIS — Z13.6 ENCOUNTER FOR SCREENING FOR CARDIOVASCULAR DISORDERS: ICD-10-CM

## 2023-09-07 DIAGNOSIS — Z13.228 ENCOUNTER FOR SCREENING FOR OTHER METABOLIC DISORDERS: ICD-10-CM

## 2023-09-07 PROCEDURE — 99395 PREV VISIT EST AGE 18-39: CPT | Mod: 25

## 2023-09-07 PROCEDURE — G0444 DEPRESSION SCREEN ANNUAL: CPT | Mod: 59

## 2023-09-07 PROCEDURE — 36415 COLL VENOUS BLD VENIPUNCTURE: CPT

## 2023-09-07 PROCEDURE — 93000 ELECTROCARDIOGRAM COMPLETE: CPT | Mod: 59

## 2023-09-07 NOTE — ASSESSMENT
[FreeTextEntry1] : annual exam- well adult screening and labs ordered further recs pending labs  Blood work drawn in office today   seizure d/o- on meds- follows neuro and stable at this time  EKG-  WNL Meconium staining

## 2023-09-07 NOTE — HEALTH RISK ASSESSMENT
[Fair] : ~his/her~ current health as fair  [Good] : ~his/her~  mood as  good [Never (0 pts)] : Never (0 points) [No] : In the past 12 months have you used drugs other than those required for medical reasons? No [No falls in past year] : Patient reported no falls in the past year [0] : 2) Feeling down, depressed, or hopeless: Not at all (0) [Patient declined Low Dose CT Scan] : Patient declined Low Dose CT Scan [Patient declined colonoscopy] : Patient declined colonoscopy [HIV Test offered] : HIV Test offered [Hepatitis C test offered] : Hepatitis C test offered [With Family] : lives with family [Feels Safe at Home] : Feels safe at home [Fully functional (bathing, dressing, toileting, transferring, walking, feeding)] : Fully functional (bathing, dressing, toileting, transferring, walking, feeding) [Fully functional (using the telephone, shopping, preparing meals, housekeeping, doing laundry, using] : Fully functional and needs no help or supervision to perform IADLs (using the telephone, shopping, preparing meals, housekeeping, doing laundry, using transportation, managing medications and managing finances) [Smoke Detector] : smoke detector [Carbon Monoxide Detector] : carbon monoxide detector [Seat Belt] :  uses seat belt [PHQ-2 Negative - No further assessment needed] : PHQ-2 Negative - No further assessment needed [Never] : Never [FreeTextEntry1] : No concerns, paperwork filled out for work.  [de-identified] : no [de-identified] : no [Audit-CScore] : 0 [de-identified] : walking  [de-identified] : healthy [[id number]] : 0 [Change in mental status noted] : No change in mental status noted [Reports changes in hearing] : Reports no changes in hearing [Reports changes in vision] : Reports no changes in vision [Reports changes in dental health] : Reports no changes in dental health [Sunscreen] : does not use sunscreen

## 2023-09-07 NOTE — HISTORY OF PRESENT ILLNESS
[FreeTextEntry1] : annual exam  [de-identified] : annual exam  needs clearance to do physical exam for [employer]- needs EKG  history of seizure d/o  stable on meds- follows with neuro

## 2023-09-11 LAB
ALBUMIN SERPL ELPH-MCNC: 5 G/DL
ALP BLD-CCNC: 74 U/L
ALT SERPL-CCNC: 22 U/L
ANION GAP SERPL CALC-SCNC: 18 MMOL/L
AST SERPL-CCNC: 16 U/L
BASOPHILS # BLD AUTO: 0.04 K/UL
BASOPHILS NFR BLD AUTO: 0.6 %
BILIRUB SERPL-MCNC: 0.5 MG/DL
BUN SERPL-MCNC: 7 MG/DL
CALCIUM SERPL-MCNC: 10 MG/DL
CHLORIDE SERPL-SCNC: 103 MMOL/L
CHOLEST SERPL-MCNC: 196 MG/DL
CO2 SERPL-SCNC: 18 MMOL/L
CREAT SERPL-MCNC: 0.96 MG/DL
EGFR: 114 ML/MIN/1.73M2
EOSINOPHIL # BLD AUTO: 0.04 K/UL
EOSINOPHIL NFR BLD AUTO: 0.6 %
ESTIMATED AVERAGE GLUCOSE: 94 MG/DL
FOLATE SERPL-MCNC: 12.8 NG/ML
GLUCOSE SERPL-MCNC: 94 MG/DL
HBA1C MFR BLD HPLC: 4.9 %
HCT VFR BLD CALC: 47 %
HCV AB SER QL: NONREACTIVE
HCV S/CO RATIO: 0.29 S/CO
HDLC SERPL-MCNC: 54 MG/DL
HGB BLD-MCNC: 14.6 G/DL
HIV1+2 AB SPEC QL IA.RAPID: NONREACTIVE
IMM GRANULOCYTES NFR BLD AUTO: 0.2 %
LDLC SERPL CALC-MCNC: 128 MG/DL
LYMPHOCYTES # BLD AUTO: 1.73 K/UL
LYMPHOCYTES NFR BLD AUTO: 26.9 %
MAN DIFF?: NORMAL
MCHC RBC-ENTMCNC: 29.9 PG
MCHC RBC-ENTMCNC: 31.1 GM/DL
MCV RBC AUTO: 96.3 FL
MONOCYTES # BLD AUTO: 0.39 K/UL
MONOCYTES NFR BLD AUTO: 6.1 %
NEUTROPHILS # BLD AUTO: 4.23 K/UL
NEUTROPHILS NFR BLD AUTO: 65.6 %
NONHDLC SERPL-MCNC: 142 MG/DL
PLATELET # BLD AUTO: 355 K/UL
POTASSIUM SERPL-SCNC: 5.1 MMOL/L
PROT SERPL-MCNC: 7.7 G/DL
RBC # BLD: 4.88 M/UL
RBC # FLD: 11.4 %
SODIUM SERPL-SCNC: 139 MMOL/L
TRIGL SERPL-MCNC: 77 MG/DL
TSH SERPL-ACNC: 0.75 UIU/ML
VIT B12 SERPL-MCNC: 664 PG/ML
WBC # FLD AUTO: 6.44 K/UL

## 2023-10-20 ENCOUNTER — NON-APPOINTMENT (OUTPATIENT)
Age: 23
End: 2023-10-20

## 2023-12-13 ENCOUNTER — APPOINTMENT (OUTPATIENT)
Dept: NEUROLOGY | Facility: CLINIC | Age: 23
End: 2023-12-13
Payer: COMMERCIAL

## 2023-12-13 VITALS
OXYGEN SATURATION: 98 % | TEMPERATURE: 98.1 F | HEIGHT: 67 IN | SYSTOLIC BLOOD PRESSURE: 130 MMHG | WEIGHT: 193 LBS | DIASTOLIC BLOOD PRESSURE: 78 MMHG | BODY MASS INDEX: 30.29 KG/M2 | HEART RATE: 78 BPM

## 2023-12-13 PROCEDURE — 99215 OFFICE O/P EST HI 40 MIN: CPT

## 2024-01-02 NOTE — PHYSICAL EXAM
[General Appearance - In No Acute Distress] : in no acute distress [General Appearance - Alert] : alert [General Appearance - Well Nourished] : well nourished [General Appearance - Well Developed] : well developed [General Appearance - Well-Appearing] : healthy appearing [Oriented To Time, Place, And Person] : oriented to person, place, and time [Impaired Insight] : insight and judgment were intact [Affect] : the affect was normal [Mood] : the mood was normal [Person] : oriented to person [Place] : oriented to place [Time] : oriented to time [Concentration Intact] : normal concentrating ability [Visual Intact] : visual attention was ~T not ~L decreased [Fluency] : fluency intact [Comprehension] : comprehension intact [Cranial Nerves Optic (II)] : visual acuity intact bilaterally,  visual fields full to confrontation, pupils equal round and reactive to light [Cranial Nerves Oculomotor (III)] : extraocular motion intact [Cranial Nerves Trigeminal (V)] : facial sensation intact symmetrically [Cranial Nerves Facial (VII)] : face symmetrical [Cranial Nerves Vestibulocochlear (VIII)] : hearing was intact bilaterally [Cranial Nerves Glossopharyngeal (IX)] : tongue and palate midline [Cranial Nerves Accessory (XI - Cranial And Spinal)] : head turning and shoulder shrug symmetric [Cranial Nerves Hypoglossal (XII)] : there was no tongue deviation with protrusion [Motor Strength] : muscle strength was normal in all four extremities [No Muscle Atrophy] : normal bulk in all four extremities [Motor Handedness Right-Handed] : the patient is right hand dominant [Sensation Tactile Decrease] : light touch was intact [Sensation Pain / Temperature Decrease] : pain and temperature was intact [Balance] : balance was intact [2+] : Ankle jerk left 2+ [Sclera] : the sclera and conjunctiva were normal [PERRL With Normal Accommodation] : pupils were equal in size, round, reactive to light, with normal accommodation [Extraocular Movements] : extraocular movements were intact [Outer Ear] : the ears and nose were normal in appearance [Hearing Threshold Finger Rub Not [place]] : hearing was normal [Oropharynx] : the oropharynx was normal [Neck Appearance] : the appearance of the neck was normal [Auscultation Breath Sounds / Voice Sounds] : lungs were clear to auscultation bilaterally [Heart Rate And Rhythm] : heart rate was normal and rhythm regular [Heart Sounds] : normal S1 and S2 [Arterial Pulses Carotid] : carotid pulses were normal with no bruits [Full Pulse] : the pedal pulses are present [Abdomen Soft] : soft [Abdomen Tenderness] : non-tender [Abdomen Mass (___ Cm)] : no abdominal mass palpated [No CVA Tenderness] : no ~M costovertebral angle tenderness [No Spinal Tenderness] : no spinal tenderness [Abnormal Walk] : normal gait [Nail Clubbing] : no clubbing  or cyanosis of the fingernails [Musculoskeletal - Swelling] : no joint swelling seen [Motor Tone] : muscle strength and tone were normal [Skin Color & Pigmentation] : normal skin color and pigmentation [Skin Turgor] : normal skin turgor [] : no rash [Paresis Pronator Drift Right-Sided] : no pronator drift on the right [Paresis Pronator Drift Left-Sided] : no pronator drift on the left [Motor Strength Upper Extremities Bilaterally] : strength was normal in both upper extremities [Motor Strength Lower Extremities Bilaterally] : strength was normal in both lower extremities [Romberg's Sign] : Romberg's sign was negtive [Allodynia] : no ~T allodynia present [Past-pointing] : there was no past-pointing [Tremor] : no tremor present [Dysdiadochokinesia Bilaterally] : not present [Coordination - Dysmetria Impaired Finger-to-Nose Bilateral] : not present [Coordination - Dysmetria Impaired Heel-to-Shin Bilateral] : not present [Plantar Reflex Right Only] : normal on the right [Plantar Reflex Left Only] : normal on the left [___] : absent on the right [___] : absent on the left [FreeTextEntry8] : Normal, narrow-based gait. No difficulty with tiptoe, heel, and tandem gaits.

## 2024-01-02 NOTE — DATA REVIEWED
[de-identified] : MRI Brain w/wo Gadolinium (Epilepsy protocol) (3/20/22): - No acute intracranial abnormality or abnormal enhancement [de-identified] : Routine EEG (2/23/22): Normal awake, drowsy, and asleep study

## 2024-01-02 NOTE — ASSESSMENT
[FreeTextEntry1] : 23 RHM with two lifetime generalized convulsive seizures following concussion with loss of consciousness for under 1 minute, raising concern for post-traumatic vs. idiopathic generalized epilepsy, with normal MRI Brain and EEG findings.

## 2024-01-02 NOTE — HISTORY OF PRESENT ILLNESS
[FreeTextEntry1] : FROM 2/8/22: This is a 21-year-old right-handed man who states that one week ago, he was in his bathroom getting ready for school (currently a senior at [employer]), and then woke up to find himself surrounded by EMTs. He does not recall what occurred, but he was told by his mother and younger brother stated that they could hear him shaking. The broke the bathroom door down, and found him leaning against the glass shower door with his whole body shaking. They observed this going on for at least 1 minute. He was observed to be confused and not responding to commands for 4 minutes, and then he was back to normal after that. He cannot identify any provoking factors, although he acknowledges sleeping for only about 5 hours the night before this seizure occurred. He was brought to the [facility] ED [place], where he had a CT Head, blood tests, and urine tests, all of which were reported to be normal. He recounts that 1 year and 3 weeks ago, he fell while skateboarding and hit his head, with brief loss of consciousness, and he was observed to have whole body shaking for less than 1 minute, with only a few seconds of confusion before returning to normal.  FROM 9/27/22: The patient, now 22, states that he has not had any seizures nor any side effects from Zonisamide 100mg PO QHS since I last saw him on 2/8/22. He is typically sleeping 6-7 hours per night, and no longer needs to use Melatonin to help him sleep.  FROM 2/8/23: Since I last saw the patient on 9/27/22, he has not had any breakthrough seizures or adverse effects while taking Zonisamide 100mg PO QHS. He continues to sleep 6-7 hours per night, without the need for a sleep aid. He has not had any recent major illnesses or hospitalizations.  FROM 12/13/23: The patient, now 23, has not had any breakthrough seizures, adverse effects to Zonisamide 100mg PO QHS, or changes in his sleep habits (getting 6-7 hours per night on most nights) since I last saw him on 2/8/23.

## 2024-02-02 ENCOUNTER — INPATIENT (INPATIENT)
Facility: HOSPITAL | Age: 24
LOS: 1 days | Discharge: HOME | DRG: 101 | End: 2024-02-04
Attending: PSYCHIATRY & NEUROLOGY | Admitting: PSYCHIATRY & NEUROLOGY
Payer: COMMERCIAL

## 2024-02-02 VITALS
SYSTOLIC BLOOD PRESSURE: 122 MMHG | OXYGEN SATURATION: 96 % | HEART RATE: 78 BPM | RESPIRATION RATE: 18 BRPM | TEMPERATURE: 98 F | DIASTOLIC BLOOD PRESSURE: 71 MMHG

## 2024-02-02 DIAGNOSIS — G40.909 EPILEPSY, UNSPECIFIED, NOT INTRACTABLE, WITHOUT STATUS EPILEPTICUS: ICD-10-CM

## 2024-02-02 LAB
ANION GAP SERPL CALC-SCNC: 13 MMOL/L — SIGNIFICANT CHANGE UP (ref 5–17)
BUN SERPL-MCNC: 12 MG/DL — SIGNIFICANT CHANGE UP (ref 7–23)
CALCIUM SERPL-MCNC: 9.3 MG/DL — SIGNIFICANT CHANGE UP (ref 8.4–10.5)
CHLORIDE SERPL-SCNC: 103 MMOL/L — SIGNIFICANT CHANGE UP (ref 96–108)
CK SERPL-CCNC: 531 U/L — HIGH (ref 30–200)
CO2 SERPL-SCNC: 21 MMOL/L — LOW (ref 22–31)
CREAT SERPL-MCNC: 0.83 MG/DL — SIGNIFICANT CHANGE UP (ref 0.5–1.3)
EGFR: 126 ML/MIN/1.73M2 — SIGNIFICANT CHANGE UP
GLUCOSE SERPL-MCNC: 93 MG/DL — SIGNIFICANT CHANGE UP (ref 70–99)
HCT VFR BLD CALC: 43.3 % — SIGNIFICANT CHANGE UP (ref 39–50)
HGB BLD-MCNC: 14.5 G/DL — SIGNIFICANT CHANGE UP (ref 13–17)
LACTATE SERPL-SCNC: 1.6 MMOL/L — SIGNIFICANT CHANGE UP (ref 0.5–2)
MAGNESIUM SERPL-MCNC: 2.3 MG/DL — SIGNIFICANT CHANGE UP (ref 1.6–2.6)
MCHC RBC-ENTMCNC: 30.4 PG — SIGNIFICANT CHANGE UP (ref 27–34)
MCHC RBC-ENTMCNC: 33.5 GM/DL — SIGNIFICANT CHANGE UP (ref 32–36)
MCV RBC AUTO: 90.8 FL — SIGNIFICANT CHANGE UP (ref 80–100)
NRBC # BLD: 0 /100 WBCS — SIGNIFICANT CHANGE UP (ref 0–0)
PHOSPHATE SERPL-MCNC: 3.6 MG/DL — SIGNIFICANT CHANGE UP (ref 2.5–4.5)
PLATELET # BLD AUTO: 327 K/UL — SIGNIFICANT CHANGE UP (ref 150–400)
POTASSIUM SERPL-MCNC: 3.9 MMOL/L — SIGNIFICANT CHANGE UP (ref 3.5–5.3)
POTASSIUM SERPL-SCNC: 3.9 MMOL/L — SIGNIFICANT CHANGE UP (ref 3.5–5.3)
RBC # BLD: 4.77 M/UL — SIGNIFICANT CHANGE UP (ref 4.2–5.8)
RBC # FLD: 11 % — SIGNIFICANT CHANGE UP (ref 10.3–14.5)
SODIUM SERPL-SCNC: 137 MMOL/L — SIGNIFICANT CHANGE UP (ref 135–145)
WBC # BLD: 7.08 K/UL — SIGNIFICANT CHANGE UP (ref 3.8–10.5)
WBC # FLD AUTO: 7.08 K/UL — SIGNIFICANT CHANGE UP (ref 3.8–10.5)

## 2024-02-02 PROCEDURE — 95720 EEG PHY/QHP EA INCR W/VEEG: CPT

## 2024-02-02 RX ORDER — ZONISAMIDE 100 MG
1 CAPSULE ORAL
Refills: 0 | DISCHARGE

## 2024-02-02 RX ORDER — LACOSAMIDE 50 MG/1
100 TABLET ORAL ONCE
Refills: 0 | Status: DISCONTINUED | OUTPATIENT
Start: 2024-02-02 | End: 2024-02-04

## 2024-02-02 RX ORDER — MIDAZOLAM HYDROCHLORIDE 1 MG/ML
1 INJECTION, SOLUTION INTRAMUSCULAR; INTRAVENOUS ONCE
Refills: 0 | Status: DISCONTINUED | OUTPATIENT
Start: 2024-02-02 | End: 2024-02-04

## 2024-02-02 RX ORDER — ENOXAPARIN SODIUM 100 MG/ML
40 INJECTION SUBCUTANEOUS EVERY 24 HOURS
Refills: 0 | Status: DISCONTINUED | OUTPATIENT
Start: 2024-02-02 | End: 2024-02-04

## 2024-02-02 NOTE — H&P ADULT - ASSESSMENT
23y M with Hx generalized epilepsy, admitted to EMU for event capture    Impression: monitor off AEDs for event capture    Plan:  [] admit to EMU (Dr. Ng)  [] neuro checks and vitals q4h  [] discontinue home Zonisamide  [] rescues: 1) Versed 1mg 1x for GTC > 3 min, 2) Vimpat 100mg 1x   [] DVT ppx: Lovenox 40mg SQ daily  [] diet: regular  [] monitor CBC, BMP, Mg, Phos, CK, lactate  [] CW and SW evaluations in AM    Case will be formally staffed in AM. Follow-up final attestation. 23y M with Hx generalized epilepsy, admitted to EMU for event capture    Impression: monitor off AEDs for event capture    Plan:  [] admit to EMU    [] neuro checks and vitals q4h  [] discontinue home Zonisamide  [] rescues: 1) Versed 1mg 1x for GTC > 3 min, 2) Vimpat 100mg 1x   [] DVT ppx: Lovenox 40mg SQ daily  [] diet: regular  [] monitor CBC, BMP, Mg, Phos, CK, lactate  [] CW and SW evaluations in AM    2/3/24 update:  Patient seen on AM neurology rounds. No acute overnight events. Continue to monitor off of AED for event capture.   Case and plan final once attested by attending.

## 2024-02-02 NOTE — H&P ADULT - HISTORY OF PRESENT ILLNESS
23 y.o. RH M with PMH of concussion and generalized seizure in the past presented to [facility] EMU for elective admission. Patient saw Dr. Edi Castro first in 2/8/22 and 1 week prior to clinic visit had breakthrough seizure. Was getting ready to go to school (goes to [employer]), and next thing he remembered was waking up next to EMT. Did not remember what happened, mother & younger brother heard shaking then went to bathroom, noted to be leaning towards the wall & shaking. Duration was presumed to be at least 1 minute. Noted that he slept less than 5 hours the night prior. Had 4 mins of confusion afterwards. At that time, went to [facility] in ED [place], underwent CTH, urine, blood tests which were normal. Was started on zonisamide 100 mg qhs. MRI brain w/wo 3/2022 was normal. Was recommended to present to EMU to see any seizure episode happen when off medication

## 2024-02-02 NOTE — H&P ADULT - NSHPSOCIALHISTORY_GEN_ALL_CORE
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

## 2024-02-02 NOTE — H&P ADULT - ATTENDING COMMENTS
Patient with 2 lifetime seizures or seizure-like events admitted to record interictal EEG off ASM to assess risk of seizure recurrence.  Patient held zonisamide starting Tuesday - 3 nights before admission. plan for vEEG - hold zonisamide.

## 2024-02-03 PROCEDURE — 99222 1ST HOSP IP/OBS MODERATE 55: CPT | Mod: GC

## 2024-02-03 PROCEDURE — 95720 EEG PHY/QHP EA INCR W/VEEG: CPT

## 2024-02-03 RX ORDER — DIAZEPAM 5 MG
5 TABLET ORAL ONCE
Refills: 0 | Status: DISCONTINUED | OUTPATIENT
Start: 2024-02-03 | End: 2024-02-04

## 2024-02-03 NOTE — EEG REPORT - NS EEG TEXT BOX
Day 1 – 	  Start: 2/2/2024  17:47  End: 2/3/2024  08:00  Duration: 13 hr  48 min    Daily EEG Visual Analysis    FINDINGS:  The background was continuous, symmetric, spontaneously variable and reactive. During wakefulness, the posterior dominant rhythm consisted of symmetric, 9-10 Hz activity, with amplitude to 30 uV, that attenuated to eye opening.  Low amplitude frontal beta was noted in wakefulness. Anterior to posterior gradient was present.     Background Slowing:  No generalized background slowing was present.  Diffuse theta and delta activity was present    Focal Slowing:   None were present.    Sleep Background:  Drowsiness was characterized by fragmentation, attenuation, and slowing of the background activity.    Sleep was characterized by the presence of vertex waves, symmetric sleep spindles and K-complexes.    Other Non-Epileptiform Findings:  None were present.  Diffuse excess beta activity.    Activation Procedures:   Hyperventilation was not performed.    Photic stimulation was not performed.    Interictal Epileptiform Activity:   None were present.    Events:  No events or seizures recorded.    Artifacts:  Intermittent myogenic and movement artifacts were noted.    ECG:  The heart rate on single channel ECG was predominantly between 70-80 BPM.    ASMs: zonisamide    EEG Summary:    Normal EEG in the awake, drowsy and asleep states.      Impression/Clinical Correlate:    This is a normal VEEG     1.	No epileptiform pattern or seizures were recorded.       Dwight Gu MD, PhD  Director, Epilepsy Division, [facility]    ------------------------------------  EEG Reading Room: [phone]  On Call Service After Hours: [phone]         Day 1 – 	  Start: 2/2/2024  17:47  End: 2/3/2024  08:00  Duration: 13 hr  48 min    Daily EEG Visual Analysis    FINDINGS:  The background was continuous, symmetric, spontaneously variable and reactive. During wakefulness, the posterior dominant rhythm consisted of symmetric, 9-10 Hz activity, with amplitude to 30 uV, that attenuated to eye opening.  Low amplitude frontal beta was noted in wakefulness. Anterior to posterior gradient was present.     Background Slowing:  No generalized background slowing was present.    Focal Slowing:   None were present.    Sleep Background:  Drowsiness was characterized by fragmentation, attenuation, and slowing of the background activity.    Sleep was characterized by the presence of vertex waves, symmetric sleep spindles and K-complexes.    Other Non-Epileptiform Findings:  None were present.    Activation Procedures:   Hyperventilation was not performed.    Photic stimulation was not performed.    Interictal Epileptiform Activity:   None were present.    Events:  No events or seizures recorded.    Artifacts:  Intermittent myogenic and movement artifacts were noted.    ECG:  The heart rate on single channel ECG was predominantly between 70-80 BPM.    ASMs: none    EEG Summary:    Normal EEG in the awake, drowsy and asleep states.      Impression/Clinical Correlate:    This is a normal VEEG     1.	No epileptiform pattern or seizures were recorded.       Dwight Gu MD, PhD  Director, Epilepsy Division, [facility]    ------------------------------------  EEG Reading Room: [phone]  On Call Service After Hours: [phone]

## 2024-02-04 ENCOUNTER — TRANSCRIPTION ENCOUNTER (OUTPATIENT)
Age: 24
End: 2024-02-04

## 2024-02-04 VITALS
RESPIRATION RATE: 18 BRPM | DIASTOLIC BLOOD PRESSURE: 74 MMHG | OXYGEN SATURATION: 97 % | HEART RATE: 72 BPM | SYSTOLIC BLOOD PRESSURE: 109 MMHG | TEMPERATURE: 98 F

## 2024-02-04 PROCEDURE — 83605 ASSAY OF LACTIC ACID: CPT

## 2024-02-04 PROCEDURE — 84100 ASSAY OF PHOSPHORUS: CPT

## 2024-02-04 PROCEDURE — 85027 COMPLETE CBC AUTOMATED: CPT

## 2024-02-04 PROCEDURE — 83735 ASSAY OF MAGNESIUM: CPT

## 2024-02-04 PROCEDURE — 82550 ASSAY OF CK (CPK): CPT

## 2024-02-04 PROCEDURE — 95700 EEG CONT REC W/VID EEG TECH: CPT

## 2024-02-04 PROCEDURE — 95716 VEEG EA 12-26HR CONT MNTR: CPT

## 2024-02-04 PROCEDURE — 80048 BASIC METABOLIC PNL TOTAL CA: CPT

## 2024-02-04 PROCEDURE — 99238 HOSP IP/OBS DSCHRG MGMT 30/<: CPT | Mod: GC

## 2024-02-04 RX ORDER — ZONISAMIDE 100 MG
200 CAPSULE ORAL ONCE
Refills: 0 | Status: COMPLETED | OUTPATIENT
Start: 2024-02-04 | End: 2024-02-04

## 2024-02-04 RX ADMIN — Medication 200 MILLIGRAM(S): at 11:05

## 2024-02-04 NOTE — EEG REPORT - NS EEG HISTORY CONTINUOUS VIDEO EEG SUG
medication adjustment/characterization of seizures/monitorng of cerebral activity
medication adjustment/characterization of seizures/monitorng of cerebral activity

## 2024-02-04 NOTE — DISCHARGE NOTE PROVIDER - NSDCCPCAREPLAN_GEN_ALL_CORE_FT
PRINCIPAL DISCHARGE DIAGNOSIS  Diagnosis: Seizure  Assessment and Plan of Treatment: You were admitted to the EMU for seizure characterization. During your stay, your anti-seizure medication, Zonisamide, was stopped and you were monitored on EEG for 48 hours. No seizures were recorded during this time and your EEG showed no increased risk of seizures. You can restart your Zonisamide 100mg QHS and follow-up outpatient with your epileptologist, Dr. Castro. Should you have another convulsive activity, please call 911.

## 2024-02-04 NOTE — DISCHARGE NOTE PROVIDER - DISCHARGE DIET
pt BIBEMS s/p fall down 3 steps with L wrist deformity. -LOC, - blood thinners. Laceration to lip, bleeding controlled at this time. GCS 15. c collar placed by EMS. Regular Diet - No restrictions

## 2024-02-04 NOTE — DISCHARGE NOTE PROVIDER - NSRESEARCHGRANT_PROPHYLAXISRECOMFT_GEN_A_CORE
No post-discharge thromboprophylaxis indicated. Stable Information: Selecting Yes will display possible errors in your note based on the variables you have selected. This validation is only offered as a suggestion for you. PLEASE NOTE THAT THE VALIDATION TEXT WILL BE REMOVED WHEN YOU FINALIZE YOUR NOTE. IF YOU WANT TO FAX A PRELIMINARY NOTE YOU WILL NEED TO TOGGLE THIS TO 'NO' IF YOU DO NOT WANT IT IN YOUR FAXED NOTE.

## 2024-02-04 NOTE — EEG REPORT - NS EEG TEXT BOX
Day 2 – 	  Start: 2/3/2024  08:00  End: 2/4/2024  08:00  Duration: 24 hr  00 min    Daily EEG Visual Analysis    FINDINGS:  The background was continuous, symmetric, spontaneously variable and reactive. During wakefulness, the posterior dominant rhythm consisted of symmetric, 9-10 Hz activity, with amplitude to 30 uV, that attenuated to eye opening.  Low amplitude frontal beta was noted in wakefulness. Anterior to posterior gradient was present.     Background Slowing:  No generalized background slowing was present.  Diffuse theta and delta activity was present    Focal Slowing:   None were present.    Sleep Background:  Drowsiness was characterized by fragmentation, attenuation, and slowing of the background activity.    Sleep was characterized by the presence of vertex waves, symmetric sleep spindles and K-complexes.    Other Non-Epileptiform Findings:  None were present.  Diffuse excess beta activity.    Activation Procedures:   Hyperventilation was not performed.    Photic stimulation was not performed.    Interictal Epileptiform Activity:   None were present.    Events:  No events or seizures recorded.    Artifacts:  Intermittent myogenic and movement artifacts were noted.    ECG:  The heart rate on single channel ECG was predominantly between 70-80 BPM.    ASMs: zonisamide dc’d    EEG Summary:  Normal EEG in the awake, drowsy and asleep states.    Impression/Clinical Correlate:  This is a normal VEEG   1.	No epileptiform pattern or seizures were recorded.     Dwight Gu MD, PhD  Director, Epilepsy Division, [facility]    ------------------------------------  EEG Reading Room: [phone]  On Call Service After Hours: [phone]       Day 2 – 	  Start: 2/3/2024  08:00  End: 2/4/2024  08:00  Duration: 24 hr  00 min    Daily EEG Visual Analysis    FINDINGS:  The background was continuous, symmetric, spontaneously variable and reactive. During wakefulness, the posterior dominant rhythm consisted of symmetric, 9-10 Hz activity, with amplitude to 30 uV, that attenuated to eye opening.  Low amplitude frontal beta was noted in wakefulness. Anterior to posterior gradient was present.     Background Slowing:  No generalized background slowing was present.    Focal Slowing:   None were present.    Sleep Background:  Drowsiness was characterized by fragmentation, attenuation, and slowing of the background activity.    Sleep was characterized by the presence of vertex waves, symmetric sleep spindles and K-complexes.    Other Non-Epileptiform Findings:  None were present.    Activation Procedures:   Hyperventilation was not performed.    Photic stimulation was not performed.    Interictal Epileptiform Activity:   None were present.    Events:  No events or seizures recorded.    Artifacts:  Intermittent myogenic and movement artifacts were noted.    ECG:  The heart rate on single channel ECG was predominantly between 70-80 BPM.    ASMs: none    EEG Summary:  Normal EEG in the awake, drowsy and asleep states.    Impression/Clinical Correlate:  This is a normal VEEG   1.	No epileptiform pattern or seizures were recorded.     Dwight Gu MD, PhD  Director, Epilepsy Division, [facility]    ------------------------------------  EEG Reading Room: [phone]  On Call Service After Hours: [phone]

## 2024-02-04 NOTE — PROGRESS NOTE ADULT - SUBJECTIVE AND OBJECTIVE BOX
Neurology Progress Note    SUBJECTIVE/OBJECTIVE/INTERVAL EVENTS: Patient seen and examined at bedside w/ neuro attending and team.     INTERVAL HISTORY: No acute events overnight.    REVIEW OF SYSTEMS: Otherwise denies fever, chills, headaches, vision changes, nausea, vomiting, hearing change, focal weakness, focal numbness, bowel/ bladder incontinence. Few questions of a 10-system ROS was performed and is negative except for those items noted above and/or in the HPI.    VITALS & EXAMINATION:  Vital Signs Last 24 Hrs  T(C): 36.6 (04 Feb 2024 08:29), Max: 37 (03 Feb 2024 16:43)  T(F): 97.9 (04 Feb 2024 08:29), Max: 98.6 (03 Feb 2024 16:43)  HR: 72 (04 Feb 2024 08:29) (65 - 76)  BP: 103/63 (04 Feb 2024 08:29) (103/63 - 135/70)  BP(mean): --  RR: 18 (04 Feb 2024 08:29) (18 - 18)  SpO2: 98% (04 Feb 2024 08:29) (96% - 99%)    Parameters below as of 04 Feb 2024 08:29  Patient On (Oxygen Delivery Method): room air        General:  Constitutional: Male, appears stated age, nontoxic, not in distress,    Head: Normocephalic;   Eyes: clear sclera;   Extremities: No cyanosis;   Resp: breathing comfortably  Neck: no nuchal rigidity  Fundoscopic exam:      Neurological (>12):  MS: Awake, alert.  Oriented person place situation. Follows commands. Attends to examiner  Language: Speech is hypophonic, clear, fluent, good repetition,  comprehension, registration of words.  CNs: PERRL (R 3mm, L 3mm). VFF. EOMI. No disconjugate gaze, nystagmus. V1-3 intact LT, No facial asymmetry b/l. Hearing grossly normal b/l. Tongue midline and can move side to side.     Motor - Normal bulk and tone throughout. No pronator drift.    L/R (out of 5 each)       Deltoid  5/5    Biceps   5/5      Triceps  5/5         Wrist Extension 5/5   Wrist Flexion  5/5   Interossei 5/5     5/5   L/R (out of 5 each)       Hip Flexion  5/5    Hip Extension  5/5  Knee Extension  5/5  Dorsiflexion  5/5      Plantar Flexion 5/5     Sensation: Intact to LT b/l. Cortical: Extinction on DSS (neglect): none  Reflexes L/R:  Biceps(C5) 2/2  BR(C6) 2/2   Triceps(C7)  2/2 Patellar(L4)   2/2   Ankles 2/2   Toes: mute b/l  Coordination: No dysmetria to FTN b/l UE  Gait: Normal Romberg. No postural instability. Normal stance. Gait baseline.    LABORATORY:  CBC                       14.5   7.08  )-----------( 327      ( 02 Feb 2024 19:05 )             43.3     Chem 02-02    137  |  103  |  12  ----------------------------<  93  3.9   |  21<L>  |  0.83    Ca    9.3      02 Feb 2024 19:05  Phos  3.6     02-02  Mg     2.3     02-02      LFTs   Coagulopathy   Lipid Panel   A1c   Cardiac enzymes CARDIAC MARKERS ( 02 Feb 2024 19:05 )  x     / x     / 531 U/L / x     / x          U/A Urinalysis Basic - ( 02 Feb 2024 19:05 )    Color: x / Appearance: x / SG: x / pH: x  Gluc: 93 mg/dL / Ketone: x  / Bili: x / Urobili: x   Blood: x / Protein: x / Nitrite: x   Leuk Esterase: x / RBC: x / WBC x   Sq Epi: x / Non Sq Epi: x / Bacteria: x      CSF  Immunological  Other    STUDIES & IMAGING: (EEG, CT, MR, U/S, TTE/CHARISSA): Neurology Progress Note    SUBJECTIVE/OBJECTIVE/INTERVAL EVENTS: Patient seen and examined at bedside w/ neuro attending and team.     INTERVAL HISTORY: No acute events overnight.    REVIEW OF SYSTEMS: Otherwise denies fever, chills, headaches, vision changes, nausea, vomiting, hearing change, focal weakness, focal numbness, bowel/ bladder incontinence. Few questions of a 10-system ROS was performed and is negative except for those items noted above and/or in the HPI.    VITALS & EXAMINATION:  Vital Signs Last 24 Hrs  T(C): 36.6 (04 Feb 2024 08:29), Max: 37 (03 Feb 2024 16:43)  T(F): 97.9 (04 Feb 2024 08:29), Max: 98.6 (03 Feb 2024 16:43)  HR: 72 (04 Feb 2024 08:29) (65 - 76)  BP: 103/63 (04 Feb 2024 08:29) (103/63 - 135/70)  BP(mean): --  RR: 18 (04 Feb 2024 08:29) (18 - 18)  SpO2: 98% (04 Feb 2024 08:29) (96% - 99%)    Parameters below as of 04 Feb 2024 08:29  Patient On (Oxygen Delivery Method): room air    General:  Constitutional: Male, appears stated age, nontoxic, not in distress,    Head: Normocephalic;   Eyes: clear sclera;   Extremities: No cyanosis;   Resp: breathing comfortably  Neck: no nuchal rigidity  Fundoscopic exam:      Neurological (>12):  MS: Awake, alert.  Oriented person place situation. Follows commands. Attends to examiner  Language: Speech is hypophonic, clear, fluent, good repetition,  comprehension, registration of words.  CNs: PERRL (R 3mm, L 3mm). VFF. EOMI. No disconjugate gaze, nystagmus. V1-3 intact LT, No facial asymmetry b/l. Hearing grossly normal b/l. Tongue midline and can move side to side.     Motor - Normal bulk and tone throughout. No pronator drift.    L/R (out of 5 each)       Deltoid  5/5    Biceps   5/5      Triceps  5/5         Wrist Extension 5/5   Wrist Flexion  5/5   Interossei 5/5     5/5   L/R (out of 5 each)       Hip Flexion  5/5    Hip Extension  5/5  Knee Extension  5/5  Dorsiflexion  5/5      Plantar Flexion 5/5     Sensation: Intact to LT b/l. Cortical: Extinction on DSS (neglect): none  Reflexes L/R:  Biceps(C5) 2/2  BR(C6) 2/2   Triceps(C7)  2/2 Patellar(L4)   2/2   Ankles 2/2   Toes: mute b/l  Coordination: No dysmetria to FTN b/l UE  Gait: Normal Romberg. No postural instability. Normal stance. Gait baseline.    LABORATORY:  CBC                       14.5   7.08  )-----------( 327      ( 02 Feb 2024 19:05 )             43.3     Chem 02-02    137  |  103  |  12  ----------------------------<  93  3.9   |  21<L>  |  0.83    Ca    9.3      02 Feb 2024 19:05  Phos  3.6     02-02  Mg     2.3     02-02      LFTs   Coagulopathy   Lipid Panel   A1c   Cardiac enzymes CARDIAC MARKERS ( 02 Feb 2024 19:05 )  x     / x     / 531 U/L / x     / x          U/A Urinalysis Basic - ( 02 Feb 2024 19:05 )    Color: x / Appearance: x / SG: x / pH: x  Gluc: 93 mg/dL / Ketone: x  / Bili: x / Urobili: x   Blood: x / Protein: x / Nitrite: x   Leuk Esterase: x / RBC: x / WBC x   Sq Epi: x / Non Sq Epi: x / Bacteria: x      CSF  Immunological  Other    STUDIES & IMAGING: (EEG, CT, MR, U/S, TTE/CHARISSA):  EEG Summary:  Normal EEG in the awake, drowsy and asleep states.    Impression/Clinical Correlate:  This is a normal VEEG   1.	No epileptiform pattern or seizures were recorded.    Neurology Progress Note    SUBJECTIVE/OBJECTIVE/INTERVAL EVENTS: Patient seen and examined at bedside w/ neuro attending and team.     INTERVAL HISTORY: No acute events overnight. Pt stable. EEG normal.     REVIEW OF SYSTEMS: Otherwise denies fever, chills, headaches, vision changes, nausea, vomiting, hearing change, focal weakness, focal numbness, bowel/ bladder incontinence. Few questions of a 10-system ROS was performed and is negative except for those items noted above and/or in the HPI.    VITALS & EXAMINATION:  Vital Signs Last 24 Hrs  T(C): 36.6 (04 Feb 2024 08:29), Max: 37 (03 Feb 2024 16:43)  T(F): 97.9 (04 Feb 2024 08:29), Max: 98.6 (03 Feb 2024 16:43)  HR: 72 (04 Feb 2024 08:29) (65 - 76)  BP: 103/63 (04 Feb 2024 08:29) (103/63 - 135/70)  BP(mean): --  RR: 18 (04 Feb 2024 08:29) (18 - 18)  SpO2: 98% (04 Feb 2024 08:29) (96% - 99%)    Parameters below as of 04 Feb 2024 08:29  Patient On (Oxygen Delivery Method): room air    General:  Constitutional: Male, appears stated age, nontoxic, not in distress,    Head: Normocephalic;   Eyes: clear sclera;   Extremities: No cyanosis;   Resp: breathing comfortably     Neurological (>12):  MS: Awake, alert. Oriented person, place and time. Follows commands. Attends to examiner  Language: Speech is clear, fluent, good repetition  CNs: PERRL (R 3mm, L 3mm). VFF. EOMI. No disconjugate gaze. V1-3 intact LT, No facial asymmetry b/l. Hearing grossly normal b/l. Tongue midline and can move side to side.     Motor - Normal bulk and tone throughout. No pronator drift.    L/R (out of 5 each)       Deltoid  5/5    Biceps   5/5      Triceps  5/5         Wrist Extension 5/5   Wrist Flexion  5/5   Interossei 5/5     5/5   L/R (out of 5 each)       Hip Flexion  5/5    Hip Extension  5/5  Knee Extension  5/5  Dorsiflexion  5/5      Plantar Flexion 5/5     Sensation: Intact to LT b/l.  Coordination: No dysmetria to FTN b/l UE      LABORATORY:  CBC                       14.5   7.08  )-----------( 327      ( 02 Feb 2024 19:05 )             43.3     Chem 02-02    137  |  103  |  12  ----------------------------<  93  3.9   |  21<L>  |  0.83    Ca    9.3      02 Feb 2024 19:05  Phos  3.6     02-02  Mg     2.3     02-02      LFTs   Coagulopathy   Lipid Panel   A1c   Cardiac enzymes CARDIAC MARKERS ( 02 Feb 2024 19:05 )  x     / x     / 531 U/L / x     / x          U/A Urinalysis Basic - ( 02 Feb 2024 19:05 )    Color: x / Appearance: x / SG: x / pH: x  Gluc: 93 mg/dL / Ketone: x  / Bili: x / Urobili: x   Blood: x / Protein: x / Nitrite: x   Leuk Esterase: x / RBC: x / WBC x   Sq Epi: x / Non Sq Epi: x / Bacteria: x      CSF  Immunological  Other    STUDIES & IMAGING: (EEG, CT, MR, U/S, TTE/CHARISSA):  EEG Summary:  Normal EEG in the awake, drowsy and asleep states.    Impression/Clinical Correlate:  This is a normal VEEG   1.	No epileptiform pattern or seizures were recorded.

## 2024-02-04 NOTE — DISCHARGE NOTE PROVIDER - HOSPITAL COURSE
23M PMHx concussion and impact seizure (2021) admitted to EMU for event capture. Noted to have events characterized as generalized shaking lasting 1 minute witnessed by family. MRI brain 3/2022 normal per reports. Started on Zonisamide 100mg QHS outpatient by Dr. Castro before being admitted for EMU. ASM was stopped on Tuesday 1/30/24. Pt has been monitored on EEG since admission and no seizures were recorded. EEG noted to be normal. Also, history of prior seizure was clarified. Pt does not have a history of "generalized epilepsy." In 2021, pt had a skateboarding accident where he was riding w/o a helmet. Pt fell and hit his head and immediately went into a seizure likely a impact seizure. No history of febrile seizures, alcohol use. Pt will restart on Zonisamide prior to home and follow-up outpatient.

## 2024-02-04 NOTE — PROGRESS NOTE ADULT - ASSESSMENT
23y M with Hx generalized epilepsy, admitted to EMU for event capture    Impression: monitor off AEDs for event capture    Plan:  [] admit to EMU    [] neuro checks and vitals q4h  [] discontinue home Zonisamide  [] rescues: 1) Versed 1mg 1x for GTC > 3 min, 2) Vimpat 100mg 1x   [] DVT ppx: Lovenox 40mg SQ daily  [] diet: regular  [] monitor CBC, BMP, Mg, Phos, CK, lactate  [] CW and SW evaluations in AM    2/3/24 update:  Patient seen on AM neurology rounds. No acute overnight events. Continue to monitor off of AED for event capture.   Case and plan final once attested by attending.  23M PMHx concussion and generalized epilepsy, admitted to EMU for event capture. Noted to have events characterized as generalized shaking lasting 1 minute or so. MRI brain 3/2022 normal per reports. Started on Zonisamide 100mg QHS outpatient.     Impression: Witnessed generalized "shaking" activity lasting 1 minute or so possibly 2/2 epileptic seizures in the setting of known generalized seizure disorder vs. other toxic/metabolic etiology.     Plan:  #Seizures  - No events overnight; EEG normal  - Continue to monitor on EEG for event capture  - Hold home Zonisamide 100mg QHS  - Rescues: 1) Versed 1mg 1x for GTC > 3 min, 2) Vimpat 100mg 1x if refractory to Versed and >3 minutes   - Monitor CBC, BMP, Mg, Phos, CK, lactate    #Preventive measures  - Neurochecks and vitals q4h  - DVT ppx: Lovenox 40mg SQ daily  - Diet: regular  - Dispo: pending clinical status 23M PMHx concussion and impact seizure (2021) admitted to EMU for event capture. Noted to have events characterized as generalized shaking lasting 1 minute or so. MRI brain 3/2022 normal per reports. Started on Zonisamide 100mg QHS outpatient. **Pt does NOT have a history of "generalized epilepsy." In 2021, pt had a skateboarding accident where he was riding w/o a helmet. Pt fell and hit his head and immediately went into a seizure likely a impact seizure. No history of febrile seizures, alcohol use. **    Impression: Witnessed generalized "shaking" activity lasting 1 minute or so possibly 2/2 epileptic seizures in the setting of known generalized seizure disorder vs. other toxic/metabolic etiology.     Plan:  #Seizures  - No events overnight; EEG normal  - Continue to monitor on EEG for event capture but if pt wanting to go home, will DC  - Restart Zonisamide 200mg QHS in anticipation of discharge home.   - Rescues: 1) Versed 1mg 1x for GTC > 3 min, 2) Vimpat 100mg 1x if refractory to Versed and >3 minutes   - Monitor CBC, BMP, Mg, Phos, CK, lactate    #Preventive measures  - Neurochecks and vitals q4h  - DVT ppx: Lovenox 40mg SQ daily  - Diet: regular  - Dispo: pending clinical status      Case seen w/ Neurology attending.

## 2024-02-04 NOTE — DISCHARGE NOTE PROVIDER - NSDCFUSCHEDAPPT_GEN_ALL_CORE_FT
[facility]  MRI  [facility]  Scheduled Appointment: 02/06/2024    Edi Castro  [facility]  NEUROLOGY [address] [facility]  Scheduled Appointment: 02/14/2024

## 2024-02-04 NOTE — PROGRESS NOTE ADULT - ATTENDING COMMENTS
VEEG normal overnight- now normal x 48 h.  Patient expresses preference for discharge. Will restart zonisamide prior to leaving hospital.  F/u with Dr. Castro to discuss pro/con of discontinuing ASM.

## 2024-02-04 NOTE — DISCHARGE NOTE NURSING/CASE MANAGEMENT/SOCIAL WORK - PATIENT PORTAL LINK FT
You can access the FollowMyHealth Patient Portal offered by [facility] by registering at the following website: [email or web address]. By joining [employer]’s FollowMyHealth portal, you will also be able to view your health information using other applications (apps) compatible with our system.

## 2024-02-12 ENCOUNTER — OUTPATIENT (OUTPATIENT)
Dept: OUTPATIENT SERVICES | Facility: HOSPITAL | Age: 24
LOS: 1 days | End: 2024-02-12
Payer: COMMERCIAL

## 2024-02-12 ENCOUNTER — APPOINTMENT (OUTPATIENT)
Dept: MRI IMAGING | Facility: CLINIC | Age: 24
End: 2024-02-12
Payer: COMMERCIAL

## 2024-02-12 DIAGNOSIS — S06.0X1A CONCUSSION WITH LOSS OF CONSCIOUSNESS OF 30 MINUTES OR LESS, INITIAL ENCOUNTER: ICD-10-CM

## 2024-02-12 PROCEDURE — 76377 3D RENDER W/INTRP POSTPROCES: CPT

## 2024-02-12 PROCEDURE — 70553 MRI BRAIN STEM W/O & W/DYE: CPT | Mod: 26

## 2024-02-12 PROCEDURE — 70553 MRI BRAIN STEM W/O & W/DYE: CPT

## 2024-02-12 PROCEDURE — A9585: CPT

## 2024-02-12 PROCEDURE — 76377 3D RENDER W/INTRP POSTPROCES: CPT | Mod: 26

## 2024-02-14 ENCOUNTER — APPOINTMENT (OUTPATIENT)
Dept: NEUROLOGY | Facility: CLINIC | Age: 24
End: 2024-02-14
Payer: COMMERCIAL

## 2024-02-14 VITALS
HEART RATE: 83 BPM | OXYGEN SATURATION: 97 % | WEIGHT: 190 LBS | TEMPERATURE: 97.9 F | DIASTOLIC BLOOD PRESSURE: 75 MMHG | SYSTOLIC BLOOD PRESSURE: 125 MMHG | HEIGHT: 67 IN | BODY MASS INDEX: 29.82 KG/M2

## 2024-02-14 DIAGNOSIS — G40.909 EPILEPSY, UNSPECIFIED, NOT INTRACTABLE, W/OUT STATUS EPILEPTICUS: ICD-10-CM

## 2024-02-14 PROCEDURE — 99215 OFFICE O/P EST HI 40 MIN: CPT

## 2024-02-14 RX ORDER — ZONISAMIDE 100 MG/1
100 CAPSULE ORAL
Qty: 90 | Refills: 1 | Status: ACTIVE | COMMUNITY
Start: 2022-02-08 | End: 1900-01-01

## 2024-02-14 NOTE — PHYSICAL EXAM
[General Appearance - Alert] : alert [General Appearance - In No Acute Distress] : in no acute distress [General Appearance - Well Nourished] : well nourished [General Appearance - Well Developed] : well developed [General Appearance - Well-Appearing] : healthy appearing [Oriented To Time, Place, And Person] : oriented to person, place, and time [Impaired Insight] : insight and judgment were intact [Affect] : the affect was normal [Mood] : the mood was normal [Person] : oriented to person [Place] : oriented to place [Time] : oriented to time [Concentration Intact] : normal concentrating ability [Visual Intact] : visual attention was ~T not ~L decreased [Fluency] : fluency intact [Comprehension] : comprehension intact [Cranial Nerves Optic (II)] : visual acuity intact bilaterally,  visual fields full to confrontation, pupils equal round and reactive to light [Cranial Nerves Oculomotor (III)] : extraocular motion intact [Cranial Nerves Trigeminal (V)] : facial sensation intact symmetrically [Cranial Nerves Facial (VII)] : face symmetrical [Cranial Nerves Vestibulocochlear (VIII)] : hearing was intact bilaterally [Cranial Nerves Glossopharyngeal (IX)] : tongue and palate midline [Cranial Nerves Accessory (XI - Cranial And Spinal)] : head turning and shoulder shrug symmetric [Cranial Nerves Hypoglossal (XII)] : there was no tongue deviation with protrusion [Motor Strength] : muscle strength was normal in all four extremities [No Muscle Atrophy] : normal bulk in all four extremities [Motor Handedness Right-Handed] : the patient is right hand dominant [Sensation Tactile Decrease] : light touch was intact [Sensation Pain / Temperature Decrease] : pain and temperature was intact [Balance] : balance was intact [2+] : Ankle jerk left 2+ [Sclera] : the sclera and conjunctiva were normal [PERRL With Normal Accommodation] : pupils were equal in size, round, reactive to light, with normal accommodation [Extraocular Movements] : extraocular movements were intact [Outer Ear] : the ears and nose were normal in appearance [Hearing Threshold Finger Rub Not [place]] : hearing was normal [Oropharynx] : the oropharynx was normal [Neck Appearance] : the appearance of the neck was normal [Auscultation Breath Sounds / Voice Sounds] : lungs were clear to auscultation bilaterally [Heart Rate And Rhythm] : heart rate was normal and rhythm regular [Heart Sounds] : normal S1 and S2 [Arterial Pulses Carotid] : carotid pulses were normal with no bruits [Full Pulse] : the pedal pulses are present [Abdomen Soft] : soft [Abdomen Tenderness] : non-tender [No CVA Tenderness] : no ~M costovertebral angle tenderness [No Spinal Tenderness] : no spinal tenderness [Abnormal Walk] : normal gait [Nail Clubbing] : no clubbing  or cyanosis of the fingernails [Musculoskeletal - Swelling] : no joint swelling seen [Motor Tone] : muscle strength and tone were normal [Skin Color & Pigmentation] : normal skin color and pigmentation [Skin Turgor] : normal skin turgor [] : no rash [Paresis Pronator Drift Right-Sided] : no pronator drift on the right [Paresis Pronator Drift Left-Sided] : no pronator drift on the left [Motor Strength Upper Extremities Bilaterally] : strength was normal in both upper extremities [Motor Strength Lower Extremities Bilaterally] : strength was normal in both lower extremities [Romberg's Sign] : Romberg's sign was negtive [Allodynia] : no ~T allodynia present [Past-pointing] : there was no past-pointing [Tremor] : no tremor present [Dysdiadochokinesia Bilaterally] : not present [Coordination - Dysmetria Impaired Finger-to-Nose Bilateral] : not present [Coordination - Dysmetria Impaired Heel-to-Shin Bilateral] : not present [Plantar Reflex Right Only] : normal on the right [Plantar Reflex Left Only] : normal on the left [___] : absent on the right [___] : absent on the left [FreeTextEntry8] : Normal, narrow-based gait. No difficulty with tiptoe, heel, and tandem gaits.

## 2024-02-14 NOTE — HISTORY OF PRESENT ILLNESS
[FreeTextEntry1] : FROM 2/8/22: This is a 21-year-old right-handed man who states that one week ago, he was in his bathroom getting ready for school (currently a senior at [employer]), and then woke up to find himself surrounded by EMTs. He does not recall what occurred, but he was told by his mother and younger brother stated that they could hear him shaking. The broke the bathroom door down and found him leaning against the glass shower door with his whole body shaking. They observed this going on for at least 1 minute. He was observed to be confused and not responding to commands for 4 minutes, and then he was back to normal after that. He cannot identify any provoking factors, although he acknowledges sleeping for only about 5 hours the night before this seizure occurred. He was brought to the [facility] ED [place], where he had a CT Head, blood tests, and urine tests, all of which were reported to be normal. He recounts that 1 year and 3 weeks ago, he fell while skateboarding and hit his head, with brief loss of consciousness, and he was observed to have whole body shaking for less than 1 minute, with only a few seconds of confusion before returning to normal.  FROM 9/27/22: The patient, now 22, states that he has not had any seizures nor any side effects from Zonisamide 100mg PO QHS since I last saw him on 2/8/22. He is typically sleeping 6-7 hours per night, and no longer needs to use Melatonin to help him sleep.  FROM 2/8/23: Since I last saw the patient on 9/27/22, he has not had any breakthrough seizures or adverse effects while taking Zonisamide 100mg PO QHS. He continues to sleep 6-7 hours per night, without the need for a sleep aid. He has not had any recent major illnesses or hospitalizations.  FROM 12/13/23: The patient, now 23, has not had any breakthrough seizures, adverse effects to Zonisamide 100mg PO QHS, or changes in his sleep habits (getting 6-7 hours per night on most nights) since I last saw him on 2/8/23.  FROM 2/14/24: Since I last saw the patient on 12/13/23, he has had a new MRI Brain scan and long-term video EEG study, both of which were unremarkable. He has not had any recent episodes concerning for seizure nor any changes to his medications recently.

## 2024-02-14 NOTE — DATA REVIEWED
[de-identified] : MRI Brain w/wo Gadolinium (Epilepsy protocol) (3/20/22): - No acute intracranial abnormality or abnormal enhancement [de-identified] : Routine EEG (2/23/22): Normal awake, drowsy, and asleep study

## 2024-07-26 NOTE — H&P ADULT - NSHPPHYSICALEXAM_GEN_ALL_CORE
Food Insecurity: Food Insecurity Present (7/23/2024)    Hunger Vital Sign    • Worried About Running Out of Food in the Last Year: Sometimes true    • Ran Out of Food in the Last Year: Never true           Hearing Loss: Care Instructions  Overview     Hearing loss is a sudden or slow decrease in how well you hear. It can range from slight to profound. Permanent hearing loss can occur with aging. It also can happen when you are exposed long-term to loud noise. Examples include listening to loud music, riding motorcycles, or being around other loud machines.  Hearing loss can affect your work and home life. It can make you feel lonely or depressed. You may feel that you have lost your independence. But hearing aids and other devices can help you hear better and feel connected to others.  Follow-up care is a key part of your treatment and safety. Be sure to make and go to all appointments, and call your doctor if you are having problems. It's also a good idea to know your test results and keep a list of the medicines you take.  How can you care for yourself at home?  Avoid loud noises whenever possible. This helps keep your hearing from getting worse.  Always wear hearing protection around loud noises.  Wear a hearing aid as directed.  A professional can help you pick a hearing aid that will work best for you.  You can also get hearing aids over the counter for mild to moderate hearing loss.  Have hearing tests as your doctor suggests. They can show whether your hearing has changed. Your hearing aid may need to be adjusted.  Use other devices as needed. These may include:  Telephone amplifiers and hearing aids that can connect to a television, stereo, radio, or microphone.  Devices that use lights or vibrations. These alert you to the doorbell, a ringing telephone, or a baby monitor.  Television closed-captioning. This shows the words at the bottom of the screen. Most new TVs can do this.  TTY (text telephone). This 
General:  Constitutional: Male, appears stated age, in no apparent distress  Head: Normocephalic; Eyes: clear sclera;   Extremities: No cyanosis; Skin: No srikanth edema of LE  Resp: breathing comfortably     Neurological (>12):  MS: Awake, alert.  Follows commands.   Language: Speech is clear, fluent, normal volume, good repetition,  comprehension, registration of words.  CNs: PERRL (R 3mm, L 3mm). VFF. EOMI. V1-3 intact LT, No facial asymmetry b/l, full. Hearing grossly normal (rubbing fingers) b/l. Tongue midline.     Motor: Normal muscle bulk & tone. No noted tremor.               Deltoid	Biceps	Triceps	   R	5	5	5	5		 	  L	5	5	5	5			  	H-Flex	K-Ext	D-Flex	P-Flex  R	5	5	5	5			 	   L	5	5	5	5		     Sensation: Intact to LT b/l. Cortical: Extinction on DSS (neglect): none  Reflexes R/L:  Biceps(C5) 2/2  BR(C6) 2/2   Triceps(C7)  2/2 Patellar(L4)   2/2   Toes: mute  Coordination: No dysmetria to FTN b/l UE  Gait: Normal Romberg. No postural instability. Normal stance.

## 2025-01-07 ENCOUNTER — APPOINTMENT (OUTPATIENT)
Dept: NEUROLOGY | Facility: CLINIC | Age: 25
End: 2025-01-07
Payer: COMMERCIAL

## 2025-01-07 ENCOUNTER — NON-APPOINTMENT (OUTPATIENT)
Age: 25
End: 2025-01-07

## 2025-01-07 VITALS
SYSTOLIC BLOOD PRESSURE: 129 MMHG | HEIGHT: 67 IN | DIASTOLIC BLOOD PRESSURE: 80 MMHG | BODY MASS INDEX: 31.39 KG/M2 | WEIGHT: 200 LBS | HEART RATE: 71 BPM

## 2025-01-07 DIAGNOSIS — G40.909 EPILEPSY, UNSPECIFIED, NOT INTRACTABLE, W/OUT STATUS EPILEPTICUS: ICD-10-CM

## 2025-01-07 PROCEDURE — 99215 OFFICE O/P EST HI 40 MIN: CPT

## 2025-05-01 ENCOUNTER — APPOINTMENT (OUTPATIENT)
Dept: INTERNAL MEDICINE | Facility: CLINIC | Age: 25
End: 2025-05-01
Payer: COMMERCIAL

## 2025-05-01 VITALS
TEMPERATURE: 97.3 F | HEIGHT: 67 IN | BODY MASS INDEX: 29.19 KG/M2 | WEIGHT: 186 LBS | HEART RATE: 85 BPM | DIASTOLIC BLOOD PRESSURE: 63 MMHG | SYSTOLIC BLOOD PRESSURE: 105 MMHG | OXYGEN SATURATION: 97 %

## 2025-05-01 DIAGNOSIS — Z13.228 ENCOUNTER FOR SCREENING FOR OTHER METABOLIC DISORDERS: ICD-10-CM

## 2025-05-01 DIAGNOSIS — Z11.1 ENCOUNTER FOR SCREENING FOR RESPIRATORY TUBERCULOSIS: ICD-10-CM

## 2025-05-01 DIAGNOSIS — Z00.00 ENCOUNTER FOR GENERAL ADULT MEDICAL EXAMINATION W/OUT ABNORMAL FINDINGS: ICD-10-CM

## 2025-05-01 PROCEDURE — 36415 COLL VENOUS BLD VENIPUNCTURE: CPT

## 2025-05-01 PROCEDURE — 99395 PREV VISIT EST AGE 18-39: CPT | Mod: 25

## 2025-05-05 LAB
25(OH)D3 SERPL-MCNC: 13.7 NG/ML
ALBUMIN SERPL ELPH-MCNC: 4.8 G/DL
ALP BLD-CCNC: 67 U/L
ALT SERPL-CCNC: 22 U/L
ANION GAP SERPL CALC-SCNC: 13 MMOL/L
AST SERPL-CCNC: 21 U/L
BASOPHILS # BLD AUTO: 0.03 K/UL
BASOPHILS NFR BLD AUTO: 0.4 %
BILIRUB SERPL-MCNC: 0.6 MG/DL
BUN SERPL-MCNC: 11 MG/DL
C TRACH RRNA SPEC QL NAA+PROBE: NOT DETECTED
CALCIUM SERPL-MCNC: 9.7 MG/DL
CHLORIDE SERPL-SCNC: 104 MMOL/L
CHOLEST SERPL-MCNC: 179 MG/DL
CO2 SERPL-SCNC: 23 MMOL/L
CREAT SERPL-MCNC: 0.82 MG/DL
EGFRCR SERPLBLD CKD-EPI 2021: 125 ML/MIN/1.73M2
EOSINOPHIL # BLD AUTO: 0.04 K/UL
EOSINOPHIL NFR BLD AUTO: 0.5 %
ESTIMATED AVERAGE GLUCOSE: 97 MG/DL
FERRITIN SERPL-MCNC: 173 NG/ML
FOLATE SERPL-MCNC: 8.7 NG/ML
GLUCOSE SERPL-MCNC: 96 MG/DL
HBA1C MFR BLD HPLC: 5 %
HCT VFR BLD CALC: 44.8 %
HCV AB SER QL: NONREACTIVE
HCV S/CO RATIO: 0.29 S/CO
HDLC SERPL-MCNC: 48 MG/DL
HGB BLD-MCNC: 14.4 G/DL
HIV1+2 AB SPEC QL IA.RAPID: NONREACTIVE
IMM GRANULOCYTES NFR BLD AUTO: 0.4 %
IRON SATN MFR SERPL: 36 %
IRON SERPL-MCNC: 108 UG/DL
LDLC SERPL-MCNC: 114 MG/DL
LYMPHOCYTES # BLD AUTO: 1.64 K/UL
LYMPHOCYTES NFR BLD AUTO: 19.5 %
M TB IFN-G BLD-IMP: NEGATIVE
MAGNESIUM SERPL-MCNC: 2.1 MG/DL
MAN DIFF?: NORMAL
MCHC RBC-ENTMCNC: 30.3 PG
MCHC RBC-ENTMCNC: 32.1 G/DL
MCV RBC AUTO: 94.3 FL
MONOCYTES # BLD AUTO: 0.5 K/UL
MONOCYTES NFR BLD AUTO: 5.9 %
N GONORRHOEA RRNA SPEC QL NAA+PROBE: NOT DETECTED
NEUTROPHILS # BLD AUTO: 6.19 K/UL
NEUTROPHILS NFR BLD AUTO: 73.3 %
NONHDLC SERPL-MCNC: 131 MG/DL
PLATELET # BLD AUTO: 360 K/UL
POTASSIUM SERPL-SCNC: 4.4 MMOL/L
PROT SERPL-MCNC: 7.4 G/DL
QUANTIFERON TB PLUS MITOGEN MINUS NIL: >10 IU/ML
QUANTIFERON TB PLUS NIL: 0.06 IU/ML
QUANTIFERON TB PLUS TB1 MINUS NIL: 0 IU/ML
QUANTIFERON TB PLUS TB2 MINUS NIL: 0.02 IU/ML
RBC # BLD: 4.75 M/UL
RBC # FLD: 11.3 %
SODIUM SERPL-SCNC: 139 MMOL/L
SOURCE AMPLIFICATION: NORMAL
T PALLIDUM AB SER QL IA: NEGATIVE
TIBC SERPL-MCNC: 300 UG/DL
TRIGL SERPL-MCNC: 94 MG/DL
TSH SERPL-ACNC: 0.94 UIU/ML
UIBC SERPL-MCNC: 191 UG/DL
VIT B12 SERPL-MCNC: 458 PG/ML
WBC # FLD AUTO: 8.43 K/UL

## 2025-06-16 ENCOUNTER — NON-APPOINTMENT (OUTPATIENT)
Age: 25
End: 2025-06-16

## 2025-06-18 ENCOUNTER — TRANSCRIPTION ENCOUNTER (OUTPATIENT)
Age: 25
End: 2025-06-18

## 2025-06-18 ENCOUNTER — APPOINTMENT (OUTPATIENT)
Dept: INTERNAL MEDICINE | Facility: CLINIC | Age: 25
End: 2025-06-18
Payer: COMMERCIAL

## 2025-06-18 VITALS
OXYGEN SATURATION: 98 % | HEIGHT: 67 IN | HEART RATE: 77 BPM | WEIGHT: 185 LBS | TEMPERATURE: 97.3 F | DIASTOLIC BLOOD PRESSURE: 82 MMHG | SYSTOLIC BLOOD PRESSURE: 122 MMHG | BODY MASS INDEX: 29.03 KG/M2

## 2025-06-18 PROCEDURE — 99213 OFFICE O/P EST LOW 20 MIN: CPT | Mod: 25

## 2025-06-18 PROCEDURE — 36415 COLL VENOUS BLD VENIPUNCTURE: CPT

## 2025-06-23 ENCOUNTER — APPOINTMENT (OUTPATIENT)
Dept: INTERNAL MEDICINE | Facility: CLINIC | Age: 25
End: 2025-06-23
Payer: COMMERCIAL

## 2025-06-23 VITALS
WEIGHT: 185 LBS | HEART RATE: 88 BPM | OXYGEN SATURATION: 98 % | SYSTOLIC BLOOD PRESSURE: 122 MMHG | BODY MASS INDEX: 29.03 KG/M2 | DIASTOLIC BLOOD PRESSURE: 79 MMHG | TEMPERATURE: 97.2 F | HEIGHT: 67 IN

## 2025-06-23 LAB
HSV 1 IGG TYPE-SPECIFIC AB: 47.5 INDEX
HSV 2 IGG TYPE-SPECIFIC AB: <0.9 INDEX

## 2025-06-23 PROCEDURE — G2211 COMPLEX E/M VISIT ADD ON: CPT | Mod: NC

## 2025-06-23 PROCEDURE — 99213 OFFICE O/P EST LOW 20 MIN: CPT

## 2025-06-23 RX ORDER — VALACYCLOVIR 1 G/1
1 TABLET, FILM COATED ORAL TWICE DAILY
Qty: 4 | Refills: 1 | Status: ACTIVE | COMMUNITY
Start: 2025-06-23 | End: 1900-01-01